# Patient Record
Sex: MALE | Race: WHITE | HISPANIC OR LATINO | Employment: FULL TIME | URBAN - METROPOLITAN AREA
[De-identification: names, ages, dates, MRNs, and addresses within clinical notes are randomized per-mention and may not be internally consistent; named-entity substitution may affect disease eponyms.]

---

## 2018-07-24 ENCOUNTER — HOSPITAL ENCOUNTER (EMERGENCY)
Facility: HOSPITAL | Age: 43
Discharge: HOME | End: 2018-07-25
Attending: EMERGENCY MEDICINE | Admitting: EMERGENCY MEDICINE
Payer: COMMERCIAL

## 2018-07-24 VITALS
WEIGHT: 208 LBS | RESPIRATION RATE: 16 BRPM | TEMPERATURE: 98.2 F | SYSTOLIC BLOOD PRESSURE: 150 MMHG | DIASTOLIC BLOOD PRESSURE: 81 MMHG | HEART RATE: 69 BPM | OXYGEN SATURATION: 98 %

## 2018-07-24 DIAGNOSIS — S89.90XA INJURY OF CALF: Primary | ICD-10-CM

## 2018-07-25 ENCOUNTER — OFFICE VISIT (OUTPATIENT)
Dept: OBGYN CLINIC | Facility: MEDICAL CENTER | Age: 43
End: 2018-07-25
Payer: COMMERCIAL

## 2018-07-25 VITALS
BODY MASS INDEX: 32.18 KG/M2 | DIASTOLIC BLOOD PRESSURE: 80 MMHG | HEART RATE: 62 BPM | HEIGHT: 67 IN | WEIGHT: 205 LBS | SYSTOLIC BLOOD PRESSURE: 124 MMHG

## 2018-07-25 DIAGNOSIS — S86.111A GASTROCNEMIUS STRAIN, RIGHT, INITIAL ENCOUNTER: Primary | ICD-10-CM

## 2018-07-25 PROCEDURE — 99203 OFFICE O/P NEW LOW 30 MIN: CPT | Performed by: FAMILY MEDICINE

## 2018-07-25 PROCEDURE — 99283 EMERGENCY DEPT VISIT LOW MDM: CPT

## 2018-07-25 RX ORDER — ACETAMINOPHEN 325 MG/1
650 TABLET ORAL ONCE
Status: COMPLETED | OUTPATIENT
Start: 2018-07-25 | End: 2018-07-25

## 2018-07-25 RX ORDER — METHOCARBAMOL 500 MG/1
500 TABLET, FILM COATED ORAL 4 TIMES DAILY PRN
Qty: 20 TABLET | Refills: 0 | Status: SHIPPED | OUTPATIENT
Start: 2018-07-25 | End: 2018-07-25

## 2018-07-25 RX ORDER — LIDOCAINE 40 MG/G
CREAM TOPICAL ONCE
Status: COMPLETED | OUTPATIENT
Start: 2018-07-25 | End: 2018-07-25

## 2018-07-25 RX ORDER — METHOCARBAMOL 500 MG/1
500 TABLET, FILM COATED ORAL ONCE
Status: COMPLETED | OUTPATIENT
Start: 2018-07-25 | End: 2018-07-25

## 2018-07-25 RX ORDER — SENNOSIDES 8.6 MG
650 CAPSULE ORAL EVERY 8 HOURS PRN
Qty: 30 TABLET | Refills: 0 | Status: SHIPPED | OUTPATIENT
Start: 2018-07-25 | End: 2018-07-25

## 2018-07-25 RX ADMIN — LIDOCAINE 4%: 4 CREAM TOPICAL at 00:47

## 2018-07-25 RX ADMIN — ACETAMINOPHEN 650 MG: 325 TABLET, FILM COATED ORAL at 00:47

## 2018-07-25 RX ADMIN — METHOCARBAMOL 500 MG: 500 TABLET ORAL at 00:47

## 2018-07-25 NOTE — LETTER
July 25, 2018     Patient: Mendoza Ayala   YOB: 1975   Date of Visit: 7/25/2018       To Whom it May Concern:    Lasha Rajwinder is under my professional care  He was seen in my office on 7/25/2018  He may return to work on 07/26/2018  If you have any questions or concerns, please don't hesitate to call           Sincerely,          Wilder Cantu III, DO        CC: Mendoza Carlsonels

## 2018-07-25 NOTE — DISCHARGE INSTRUCTIONS
Take medications as directed  Use ace wrap/ice/elevation and crutches for comfort  Follow up with Orthopedic Surgery for reevaluation and further workup as directed  Return to ED if new or worsening symptoms including unbearable pain, weakness/numbness/tingling in extremity or any other concerns for immediate reevaluation  Muscle Strain   WHAT YOU NEED TO KNOW:   A muscle strain is a twist, pull, or tear of a muscle or tendon  A tendon is a strong elastic tissue that connects a muscle to a bone  Signs of a strained muscle include bruising and swelling over the area, pain with movement, and loss of strength  DISCHARGE INSTRUCTIONS:   Return to the emergency department if:   · You suddenly cannot feel or move your injured muscle  Contact your healthcare provider if:   · Your pain and swelling worsen or do not go away  · You have questions or concerns about your condition or care  Medicines:   · NSAIDs  help decrease swelling and pain or fever  This medicine is available with or without a doctor's order  NSAIDs can cause stomach bleeding or kidney problems in certain people  If you take blood thinner medicine, always ask your healthcare provider if NSAIDs are safe for you  Always read the medicine label and follow directions  · Muscle relaxers  help decrease pain and muscle spasms  · Take your medicine as directed  Contact your healthcare provider if you think your medicine is not helping or if you have side effects  Tell him of her if you are allergic to any medicine  Keep a list of the medicines, vitamins, and herbs you take  Include the amounts, and when and why you take them  Bring the list or the pill bottles to follow-up visits  Carry your medicine list with you in case of an emergency  Follow up with your healthcare provider as directed: Your healthcare provider may suggest that you have a follow-up visit before you go back to your usual activity   Write down your questions so you remember to ask them during your visits  Self-care:   · 3 to 7 days after the injury:  Use Rest, Ice, Compression, and Elevation (RICE) to help stop bruising and decrease pain and swelling  ¨ Rest:  Rest your muscle to allow your injury to heal  When the pain decreases, begin normal, slow movements  For mild and moderate muscle strains, you should rest your muscles for about 2 days  However, if you have a severe muscle strain, you should rest for 10 to 14 days  You may need to use crutches to walk if your muscle strain is in your legs or lower body  ¨ Ice:  Put an ice pack on the injured area  Put a towel between the ice pack and your skin  Do not put the ice pack directly on your skin  You can use a package of frozen peas instead of an ice pack  ¨ Compression:  You may need to wrap an elastic bandage around the area to decrease swelling  It should be tight enough for you to feel support  Do not wrap it too tightly  ¨ Elevation:  Keep the injured muscle raised above your heart if possible  For example if you have a strain of your lower leg muscle, lie down and prop your leg up on pillows  This helps decrease pain and swelling  · 3 to 21 days after the injury:  Start to slowly and regularly exercise your muscle  This will help it heal  If you feel pain, decrease how hard you are exercising  · 1 to 6 weeks after the injury:  Stretch the injured muscle  Hold the stretch for about 30 seconds  Do this 4 times a day  You may stretch the muscle until you feel a slight pull  Stop stretching if you feel pain  · 2 weeks to 6 months after the injury:  The goal of this phase is to return to the activity you were doing before the injury happened, without hurting the muscle again  · 3 weeks to 6 months after the injury:  Keep stretching and strengthening your muscles to avoid injury  Slowly increase the time and distance that you exercise   You may have signs and symptoms of muscle strain 6 months after the injury, even if you do things to help it heal  In this case, you may need surgery on the muscle  © 2017 [facility] Information is for End User's use only and may not be sold, redistributed or otherwise used for commercial purposes  All illustrations and images included in CareNotes® are the copyrighted property of [employer]  or [employer]ago Colvin  The above information is an  only  It is not intended as medical advice for individual conditions or treatments  Talk to your doctor, nurse or pharmacist before following any medical regimen to see if it is safe and effective for you

## 2018-07-25 NOTE — PROGRESS NOTES
1  Gastrocnemius strain, right, initial encounter       No orders of the defined types were placed in this encounter  Imaging Studies (I personally reviewed results in PACS):    IMPRESSION:  Tennis calf secondary to basketball injury right leg medial gastroc strain      Return in about 2 weeks (around 8/8/2018)  Patient Instructions   Rest ice elevation  No heat  Start Cam walker boot  Patient may require crutches for distal help over next 1-2 weeks  I recommend against driving explained the patient that his lack of strength in his driving foot could result in risk of motor vehicle accident and/or death  Patient expressed understanding agreed to plan  Explained the patient that he has a tear in the medial head of his gastrocnemius muscle in his calf  Explained this is also known as tennis calf  This should resolve over the next 4-6 weeks completely  I instructed patient to perform range-of-motion exercises and also use ice therapy  Instructed to avoid driving over the next 1 week due to pain and reduced strength of posterior calf that results in limited pedal foot reaction time and would place him and others at risk for motor vehicle accident and death  I instructed gradual return to driving and to avoid long distances over the next 1-2 weeks as pain resolves and strength returns  If he does not have any relief resolution within the next 1 week then I recommend starting physical therapy to help speed up his recovery  I did review red flags including swelling, redness, worsening of pain instructed that this could be a sign of compartment syndrome or DVT/clot in the leg which can be limb and life threatening issue  Patient expressed understanding agreed to plan  CHIEF COMPLAINT:  Right calf pain    HPI:  Shane Gonzalez is a 43 y o  male  who presents for       Visit  07/25/2018:   Evaluation of right calf pain that occurred yesterday 07/24/2018    Patient states he was playing basketball yesterday the median the sprinting felt a popping like sensation in his calf with immediate pain  Patient was able to bear weight afterwards her he was limping  Today, patient presents with crutches as well as a German was given to him emergency department  He states that overall he feels no improvement since his injury yesterday  Patient states he is able to ambulate on it with difficulty due to pain  He points to the medial aspect of his grass [place] knee medius as source of achy intermittent sharp moderate intensity pain  Patient was given muscle relaxer Robaxin as well as lidocaine cream and Tylenol  Denies any pain at rest   Denies any numbness  Review of Systems   Constitutional: Negative for chills, fever and unexpected weight change  HENT: Negative for hearing loss, nosebleeds and sore throat  Eyes: Negative for pain, redness and visual disturbance  Respiratory: Negative for cough, shortness of breath and wheezing  Cardiovascular: Negative for chest pain, palpitations and leg swelling  Gastrointestinal: Negative for abdominal distention, nausea and vomiting  Endocrine: Negative for polydipsia and polyuria  Genitourinary: Negative for dysuria and hematuria  Skin: Negative for rash and wound  Neurological: Negative for dizziness, numbness and headaches  Psychiatric/Behavioral: Negative for decreased concentration and suicidal ideas  Following history reviewed and update:    History reviewed  No pertinent past medical history  History reviewed  No pertinent surgical history    Social History   History   Alcohol Use    Yes     Comment: social     History   Drug Use No     History   Smoking Status    Former Smoker   Smokeless Tobacco    Never Used     Family History   Problem Relation Age of Onset    No Known Problems Mother     No Known Problems Father      Allergies   Allergen Reactions    Ibuprofen Swelling     Lip swelling    Levothyroxine Physical Exam  Constitutional:  see vital signs  Gen: well-developed, normocephalic/atraumatic, well-groomed  Eyes: No inflammation or discharge of conjunctiva or lids; sclera clear   Pharynx: no inflammation, lesion, or mass of lips  Neck: supple, no masses, non-distended  MSK: no inflammation, lesion, mass, or clubbing of nails and digits except for other than mentioned below  SKIN: no visible rashes or skin lesions  Pulmonary/Chest: Effort normal  No respiratory distress     NEURO: cranial nerves grossly intact  PSYCH:  Alert and oriented to person, place, and time; recent and remote memory intact; mood normal, no depression, anxiety, or agitation, judgment and insight good and intact     Ortho Exam  RIGHT ANKLE & FOOT EXAM  Observation  GAIT:  normal    Inspection  Erythema: no  Ecchymosis: no  Edema:  none      Tenderness  Proximal Fibula: no  (Maisonneuve frx)  AiTFL: no  (2cm proximal-medial to tip lateral malleolus 92% sens, 29% spec)  ATFL: no  CFL: no  PTFL: no  Achilles:  no  deltoid: No  Peroneal: no  Tibialis Anterior: no  Tibialis Posterior: no    Bony Tenderpoints:  Lateral Malleolus: no  Base of 5th MT: no  Medial Malleolus: no  Navicular: no  Talar dome: No    ROM  Dorsiflexion: intact  Plantarflexion: intact    Muscle Strength  Pronation: intact without pain  Supination: intact without pain    Tib-Fib Squeeze: negative  (esouvynvouyr-tz-qmkmgtwquckscc squeeze; 26% sens, 88% spec; rule in test)    Calcaneal Squeeze: negative    Dorsiflexion (+) ER Stress Test: negative  (reproduce ATiFL mech; 71% sens, 63% spec)      RIGHT CALF EXAM:  No swelling erythema or increased warmth  No palpable cords  Tenderness: + significant tenderness medial gastrocs that reproduces patient's chief complaint of pain  Negative Conner sign  Eugene test intact obligatory plantar flexion and symmetric bilateral    RIGHT FOOT EXAM:  No swelling, erythema or increased warmth  Tenderness: none  ROM Toes extension: intact  ROM Toes flexion: intact  Strength Toes: 5/5 flex, ext  Sensation intact  Capillary refill intact         Procedures

## 2018-07-25 NOTE — PATIENT INSTRUCTIONS
Rest ice elevation  No heat  Start Cam walker boot  Patient may require crutches for distal help over next 1-2 weeks  I recommend against driving explained the patient that his lack of strength in his driving foot could result in risk of motor vehicle accident and/or death  Patient expressed understanding agreed to plan  Explained the patient that he has a tear in the medial head of his gastrocnemius muscle in his calf  Explained this is also known as tennis calf  This should resolve over the next 4-6 weeks completely  I instructed patient to perform range-of-motion exercises and also use ice therapy  Instructed to avoid driving over the next 1 week due to pain and reduced strength of posterior calf that results in limited pedal foot reaction time and would place him and others at risk for motor vehicle accident and death  I instructed gradual return to driving and to avoid long distances over the next 1-2 weeks as pain resolves and strength returns  If he does not have any relief resolution within the next 1 week then I recommend starting physical therapy to help speed up his recovery  I did review red flags including swelling, redness, worsening of pain instructed that this could be a sign of compartment syndrome or DVT/clot in the leg which can be limb and life threatening issue  Patient expressed understanding agreed to plan

## 2018-07-25 NOTE — ED PROVIDER NOTES
History  Chief Complaint   Patient presents with    Leg Injury     pt was playing basketball today, went to run for the ball and felt a pop/pain in his right calf     44 yo M presents to ED for evaluation of R posterior lower leg (calf) pain that occurred suddenly while running playing basketball  Pt states he heard a "pop" and experience significant pain to the area, difficulty ambulating with right calf swelling since that time, no ecchymosis and no c/o hip/knee/ankle/foot pain  Pt states he did not fall, no head injury/LOC, denies any additional trauma/injury, no other medical complaints at this time  Pt has no significant PMH or PSH  Pt is a former smoker, (+) ETOH use, denies any recreational drug use  Pt states only takes OTC herbal medications (arginine/carnitine for muscle building with his workouts)  No hx of similar events  No improvement with ice to area, no other interventions prior to arrival, no other complaints at this time  None       History reviewed  No pertinent past medical history  History reviewed  No pertinent surgical history  Family History   Problem Relation Age of Onset    No Known Problems Mother     No Known Problems Father      I have reviewed and agree with the history as documented  Social History   Substance Use Topics    Smoking status: Former Smoker    Smokeless tobacco: Never Used    Alcohol use Yes      Comment: social        Review of Systems   Constitutional: Negative for chills, fatigue and fever  HENT: Negative for congestion, rhinorrhea and sore throat  Eyes: Negative for pain, redness and visual disturbance  Respiratory: Negative for cough, chest tightness, shortness of breath, wheezing and stridor  Cardiovascular: Negative for chest pain, palpitations and leg swelling  Gastrointestinal: Negative for abdominal pain, blood in stool, constipation, diarrhea, nausea and vomiting     Genitourinary: Negative for dysuria, frequency, hematuria and urgency  Musculoskeletal: Negative for back pain and neck pain  R calf injury/swelling   Skin: Negative for pallor and rash  Neurological: Negative for dizziness, seizures, syncope, weakness, light-headedness and headaches  Psychiatric/Behavioral: Negative for agitation and confusion  Physical Exam  ED Triage Vitals [07/24/18 2339]   Temperature Pulse Respirations Blood Pressure SpO2   98 2 °F (36 8 °C) 69 16 150/81 98 %      Temp Source Heart Rate Source Patient Position - Orthostatic VS BP Location FiO2 (%)   Tympanic -- -- -- --      Pain Score       9           Orthostatic Vital Signs  Vitals:    07/24/18 2339   BP: 150/81   Pulse: 69       Physical Exam   Constitutional: He is oriented to person, place, and time  He appears well-developed and well-nourished  No distress  HENT:   Head: Normocephalic and atraumatic  Nose: Nose normal    Mouth/Throat: Oropharynx is clear and moist  No oropharyngeal exudate  Eyes: Conjunctivae and EOM are normal  Pupils are equal, round, and reactive to light  Right eye exhibits no discharge  Left eye exhibits no discharge  Neck: Normal range of motion  Neck supple  No JVD present  No tracheal deviation present  Cardiovascular: Normal rate, regular rhythm, normal heart sounds and intact distal pulses  Exam reveals no gallop and no friction rub  No murmur heard  Pulmonary/Chest: Effort normal and breath sounds normal  No stridor  No respiratory distress  He has no wheezes  He has no rales  He exhibits no tenderness  Abdominal: Soft  Bowel sounds are normal  He exhibits no distension  There is no tenderness  There is no rebound and no guarding  Musculoskeletal: Normal range of motion  He exhibits edema and tenderness  He exhibits no deformity     Intact dorsiflexion/plantar flexion of affected foot, small amount of swelling along achilles tendon (+) Eugene test, tenderness over medial calf muscle belly (along gastrocnemius m ) with swelling to calf 2 cm > than L calf (42 5 cm at max width) but soft compartments, neurovascularly intact, no open wounds/ecchymosis   Neurological: He is alert and oriented to person, place, and time  No cranial nerve deficit  Skin: Skin is warm and dry  No rash noted  He is not diaphoretic  Psychiatric: He has a normal mood and affect  Nursing note and vitals reviewed  ED Medications  Medications   acetaminophen (TYLENOL) tablet 650 mg (650 mg Oral Given 7/25/18 0047)   methocarbamol (ROBAXIN) tablet 500 mg (500 mg Oral Given 7/25/18 0047)   lidocaine (LMX) 4 % cream ( Topical Given 7/25/18 0047)       Diagnostic Studies  Results Reviewed     None                 No orders to display         Procedures  Procedures      Phone Consults  ED Phone Contact    ED Course       Ultrasound of leg shows diffuse inflammatory changes of calf muscle, no significant tears/hematomas noted along calf muscles/achilles/popliteal fossa       Lidocaine cream with ace wrap and crutches, analgesics provided  Patient reevaluated with improvement in condition noted  Patient updated on results of tests  Discharge instructions given including medications, follow-up and return precautions with understanding verbalized  MDM  CritCare Time    Disposition  Final diagnoses:   Injury of calf - right medial calf injury     Time reflects when diagnosis was documented in both MDM as applicable and the Disposition within this note     Time User Action Codes Description Comment    7/25/2018 12:52 AM Alison Mosqueda Add [S89 90XA] Injury of calf     7/25/2018 12:53 AM Alison Mosqueda Modify [S89 90XA] Injury of calf right medial calf injury      ED Disposition     ED Disposition Condition Comment    Discharge  Rosalba Nyhacara discharge to home/self care      Condition at discharge: Stable        Follow-up Information     Follow up With Specialties Details Why [id number] [facility] Orthopedic Surgery  Call to schedule appointment for reevaluation and further workup of R calf injury as directed [address] [id number]  [phone]          Discharge Medication List as of 7/25/2018 12:56 AM      START taking these medications    Details   acetaminophen (TYLENOL) 650 mg CR tablet Take 1 tablet (650 mg total) by mouth every 8 (eight) hours as needed for mild pain or moderate pain, Starting Wed 7/25/2018, Print      methocarbamol (ROBAXIN) 500 mg tablet Take 1 tablet (500 mg total) by mouth 4 (four) times a day as needed for muscle spasms, Starting Wed 7/25/2018, Print         CONTINUE these medications which have NOT CHANGED    Details   Arginine 1000 MG TABS Take 2,500 mg by mouth, Historical Med           No discharge procedures on file  ED Provider  Attending physically available and evaluated Yvette Fernandez I managed the patient along with the ED Attending      Electronically Signed by         Nancy Peters DO  07/27/18 9426

## 2018-07-25 NOTE — ED ATTENDING ATTESTATION
Walter Ochoa MD, saw and evaluated the patient  I have discussed the patient with the resident/non-physician practitioner and agree with the resident's/non-physician practitioner's findings, Plan of Care, and MDM as documented in the resident's/non-physician practitioner's note, except where noted  All available labs and Radiology studies were reviewed  At this point I agree with the current assessment done in the Emergency Department  I have conducted an independent evaluation of this patient a history and physical is as follows:    Patient presents with R calf pain that started while running playing basketball  Reports feeling a pop  Unable to ambulate prior to coming in  No additional injuries or complaints  Exam: AAOx3, NAD, RRR, CTA, S/NT/ND  A/P: Chest pain  Will check cardiac labs, ekg, cxr, and ekg T/ND  A/P: Chest pain  Will check cardiac labs, ekg, cxr, and ekg      Critical Care Time  CritCare Time    Procedures

## 2018-08-02 ENCOUNTER — TELEPHONE (OUTPATIENT)
Dept: OBGYN CLINIC | Facility: HOSPITAL | Age: 43
End: 2018-08-02

## 2018-08-02 ENCOUNTER — APPOINTMENT (EMERGENCY)
Dept: NON INVASIVE DIAGNOSTICS | Facility: HOSPITAL | Age: 43
End: 2018-08-02
Payer: COMMERCIAL

## 2018-08-02 ENCOUNTER — HOSPITAL ENCOUNTER (EMERGENCY)
Facility: HOSPITAL | Age: 43
Discharge: HOME | End: 2018-08-02
Attending: EMERGENCY MEDICINE
Payer: COMMERCIAL

## 2018-08-02 VITALS
DIASTOLIC BLOOD PRESSURE: 78 MMHG | TEMPERATURE: 98.8 F | OXYGEN SATURATION: 99 % | HEART RATE: 73 BPM | SYSTOLIC BLOOD PRESSURE: 117 MMHG | RESPIRATION RATE: 16 BRPM

## 2018-08-02 DIAGNOSIS — S86.111D STRAIN OF GASTROCNEMIUS MUSCLE, RIGHT, SUBSEQUENT ENCOUNTER: Primary | ICD-10-CM

## 2018-08-02 PROCEDURE — 99284 EMERGENCY DEPT VISIT MOD MDM: CPT

## 2018-08-02 PROCEDURE — 93971 EXTREMITY STUDY: CPT

## 2018-08-02 RX ORDER — ACETAMINOPHEN 325 MG/1
650 TABLET ORAL ONCE
Status: COMPLETED | OUTPATIENT
Start: 2018-08-02 | End: 2018-08-02

## 2018-08-02 RX ADMIN — ACETAMINOPHEN 650 MG: 325 TABLET, FILM COATED ORAL at 18:19

## 2018-08-02 NOTE — TELEPHONE ENCOUNTER
Caller: patient  Callback# [phone]  Dr Nury Gonzalez      Patient was seen in office 08/01 for lower leg pain he is still experiencing pain  What should he do? Please advise thanks

## 2018-08-02 NOTE — TELEPHONE ENCOUNTER
Dr Osa Kawasaki patient - 7/25 Gastroc strain    Patient calling back that he is still experiencing pain, and swelling, possibly a bit worse than when he was in the office, also complaining of tenderness to touch  Denies redness  He is wondering if you can give him an RX for outpatient venous doppler to rule out DVT as you advised him about at last ov

## 2018-08-02 NOTE — TELEPHONE ENCOUNTER
Called and spoke to Patient  Said he could come in today at 1:45 or go to the ER  Patient said he would find a ride so he could avoid waiting in ER  Patient was put on schedule for 1:45

## 2018-08-02 NOTE — ED NOTES
Spoke with vascular at this time   Will be over shortly to compete doppler     Bailee Segura RN  08/02/18 0635

## 2018-08-02 NOTE — ED PROVIDER NOTES
History  Chief Complaint   Patient presents with    Leg Pain     pt was seen for a right calf injury 10 days ago while playing basketball  reports the pain and swelling has not gotten worse, but has not gotten better and he was told to come in if it did not improve by his orthopedist     Patient is a 80-year-old male presents with right calf pain  Patient presented to the ED approximately 10 days ago with the above complaint  Patient had immediate onset of right calf pain while playing basketball  He said he heard a pop at that time  During his previous visit the patient had an ultrasound which was negative for hematoma or muscle tear per previous note  The following day the patient followed up with Orthopedics (Dr Fabian Felder)  Dr Gustavo Pascual after he diagnosed the patient with a right calf strain  He was instructed to use a walking boot, elevate the leg, ice it  Patient is followed the instructions but has failed to improve  Patient tells me he has had constant right medial calf pain since this time  He notes that the swelling has yet to go down though he denies any erythema in the area  Patient is ambulatory in the walking boot so he does have a limping gait  Patient denies foot numbness, paresthesias, paralysis, cool extremities  Patient has no history of DVTs  He denies chest pain, dyspnea, hemoptysis  None       History reviewed  No pertinent past medical history  History reviewed  No pertinent surgical history  Family History   Problem Relation Age of Onset    No Known Problems Mother     No Known Problems Father      I have reviewed and agree with the history as documented  Social History   Substance Use Topics    Smoking status: Former Smoker    Smokeless tobacco: Never Used    Alcohol use Yes      Comment: social        Review of Systems   Constitutional: Negative for chills, diaphoresis, fatigue and fever     HENT: Negative for drooling, facial swelling, sore throat and trouble swallowing  Eyes: Negative for photophobia  Respiratory: Negative for cough, chest tightness and shortness of breath  Cardiovascular: Positive for leg swelling  Negative for chest pain and palpitations  Gastrointestinal: Negative for abdominal distention, abdominal pain, nausea and vomiting  Genitourinary: Negative for dysuria  Musculoskeletal: Positive for gait problem  Negative for back pain, neck pain and neck stiffness  Right calf pain   Skin: Negative for color change, pallor and rash  Neurological: Negative for dizziness, speech difficulty, weakness, light-headedness, numbness and headaches  Hematological: Negative for adenopathy  All other systems reviewed and are negative  Physical Exam  ED Triage Vitals   Temperature Pulse Respirations Blood Pressure SpO2   08/02/18 1620 08/02/18 1620 08/02/18 1620 08/02/18 1620 08/02/18 1620   98 8 °F (37 1 °C) 73 16 117/78 99 %      Temp Source Heart Rate Source Patient Position - Orthostatic VS BP Location FiO2 (%)   08/02/18 1620 08/02/18 1620 08/02/18 1620 08/02/18 1620 --   Tympanic Monitor Sitting Left arm       Pain Score       08/02/18 1819       9           Orthostatic Vital Signs  Vitals:    08/02/18 1620   BP: 117/78   Pulse: 73   Patient Position - Orthostatic VS: Sitting       Physical Exam   Constitutional: He is oriented to person, place, and time  He appears well-developed and well-nourished  No distress  HENT:   Head: Normocephalic  Eyes: EOM are normal  Pupils are equal, round, and reactive to light  Neck: Normal range of motion  Neck supple  Cardiovascular: Normal rate, regular rhythm, normal heart sounds and intact distal pulses  Exam reveals no gallop and no friction rub  No murmur heard  Pulmonary/Chest: Effort normal and breath sounds normal    Abdominal: Soft  Bowel sounds are normal  He exhibits no distension  There is no tenderness  There is no guarding     Musculoskeletal: Normal range of motion  Patient is found to have tenderness over the right medial gastrocnemius  Swelling of right calf is noted with no erythema  Compartments are soft  Neurovascularly intact distally including 2+ DP and posterior tibial pulses  Weakness plantar foot flexion of the foot  Sensation intact throughout  Neurological: He is alert and oriented to person, place, and time  No cranial nerve deficit or sensory deficit  He exhibits normal muscle tone  Skin: Capillary refill takes less than 2 seconds  Psychiatric: He has a normal mood and affect  His behavior is normal  Judgment and thought content normal    Vitals reviewed  ED Medications  Medications   acetaminophen (TYLENOL) tablet 650 mg (650 mg Oral Given 8/2/18 1819)       Diagnostic Studies  Results Reviewed     None                 VAS lower limb venous duplex study, unilateral/limited    (Results Pending)         Procedures  Procedures      Phone Consults  ED Phone Contact    ED Course  ED Course as of Aug 02 2101   Thu Aug 02, 2018   1813 Patient resting comfortably in the room  2052 Duplex negative                                MDM  Number of Diagnoses or Management Options  Strain of gastrocnemius muscle, right, subsequent encounter:   Diagnosis management comments: Patient is a 44-year-old male presents with persistent right calf pain  Ultrasound has been ordered to rule out DVT, hematoma, muscle tear  Ultrasound negative  Patient told to return for follow-up with Dr Dominique Melchor    Patient told to continue supportive measures including elevation, ice, rest        Amount and/or Complexity of Data Reviewed  Clinical lab tests: ordered and reviewed  Tests in the radiology section of CPT®: ordered and reviewed  Tests in the medicine section of CPT®: ordered and reviewed    Risk of Complications, Morbidity, and/or Mortality  Presenting problems: low  Diagnostic procedures: minimal  Management options: minimal    Patient Progress  Patient progress: stable    CritCare Time    Disposition  Final diagnoses:   Strain of gastrocnemius muscle, right, subsequent encounter     Time reflects when diagnosis was documented in both MDM as applicable and the Disposition within this note     Time User Action Codes Description Comment    8/2/2018  8:53 PM Dee Gordon Add [S86 [id number]] Strain of gastrocnemius muscle, right, subsequent encounter       ED Disposition     ED Disposition Condition Comment    Discharge  Silvia Varghese discharge to home/self care  Condition at discharge: Stable        Follow-up Information     Follow up With Specialties Details Why [facility], DO Sports Medicine, Orthopedics   [facility] [address] [facility]  [phone]      [place] [address] In 1 day  [address]  [phone]            There are no discharge medications for this patient  No discharge procedures on file  ED Provider  Attending physically available and evaluated Silvia Varghese I managed the patient along with the ED Attending      Electronically Signed by         Damion Vega MD  08/02/18 1731

## 2018-08-02 NOTE — ED ATTENDING ATTESTATION
I, Misti Kennedy DO, saw and evaluated the patient  I have discussed the patient with the resident/non-physician practitioner and agree with the resident's/non-physician practitioner's findings, Plan of Care, and MDM as documented in the resident's/non-physician practitioner's note, except where noted  All available labs and Radiology studies were reviewed  At this point I agree with the current assessment done in the Emergency Department  I have conducted an independent evaluation of this patient a history and physical is as follows:      Critical Care Time  CritCare Time    Procedures     43 yr male to the ED with continued pain in left calf after injury a month ago  Sent to the ED by ortho for MRI or US  Pain at the same location since the injury  Pt with walking shoe and does a lot of walking at work  Crutches are hurting arm pits  Exm; 3 cm area hardness at the med MsTend junction of the gastroc  Pln: US and referral to ortho

## 2018-08-02 NOTE — TELEPHONE ENCOUNTER
Patient needs appt asap today or go to ER  He needs to have a doctor look at his leg to make sure he does not have an infection   Fit him in today with me and then I will order ultrasound

## 2018-08-03 PROCEDURE — 93971 EXTREMITY STUDY: CPT | Performed by: SURGERY

## 2018-08-03 NOTE — DISCHARGE INSTRUCTIONS
Muscle Strain   AMBULATORY CARE:   A muscle strain  is a twist, pull, or tear of a muscle or tendon  A tendon is a strong elastic tissue that connects a muscle to a bone  Common symptoms include the following:   · Bruised skin on the area of your injured muscle    · Muscle soreness, cramps, or spasms    · Little or stiff muscle movement, or loss of muscle strength    · Swelling in the area of the injury    · Muscle pain that gets worse with activity, or pain that moves or spreads to another body area    · Crepitus (crackling sound or grating feeling) when you move your muscle  Seek immediate care for the following symptoms:   · Sudden loss of feeling or movement in your injured muscle    Contact your healthcare provider if:   · Your pain and swelling worsen or do not go away  · You have questions or concerns about your condition or care  Treatment for a muscle strain  may include any of the following:  · NSAIDs , such as ibuprofen, help decrease swelling, pain, and fever  This medicine is available with or without a doctor's order  NSAIDs can cause stomach bleeding or kidney problems in certain people  If you take blood thinner medicine, always ask your healthcare provider if NSAIDs are safe for you  Always read the medicine label and follow directions  · Muscle relaxers  help decrease pain and muscle spasms, and relax your muscles  · Steroid medicine  may be given to decrease pain and inflammation  · Local anesthetic  is medicine used to numb the area for a short time  This is often used if you have a muscle strain in your back  · Physical therapy exercises  may help improve movement and decrease pain  Physical therapy can also help improve strength and decrease your risk for loss of function  · Surgery  may be needed if your muscle strain does not heal after 6 months of treatment  Surgery may be done to drain blood that has pooled in your muscle   If your tendon was torn off of the bone, it may be put back with surgery  Manage your symptoms:   · Rest your muscle  to allow your injury to heal  When the pain decreases, begin normal, slow movements  For mild and moderate muscle strains, rest your muscles for about 2 days  Rest for 10 to 14 days if you have a severe muscle strain  You may need to use crutches if your muscle strain is in your legs  · Apply ice on your injured area  for 15 to 20 minutes every hour or as directed  Use an ice pack, or put crushed ice in a plastic bag  Cover it with a towel  Ice helps prevent tissue damage and decreases swelling and pain  · Use an elastic bandage as directed  Wrap the bandage around the area to decrease swelling  It should be snug, but not too tight  · Elevate your injured muscle  above the level of your heart as often as you can  This will help decrease swelling and pain  Prop your injured muscle on pillows or blankets to keep it elevated comfortably  · Stretch and strengthen your muscles each day  Stretch for about 30 seconds, 4 times a day  Stretch the muscle until you feel a slight pull  Stop stretching if you feel pain  Start to exercise and strengthen your muscles slowly  Increase the time and amount you exercise  Prevent another muscle strain:   · Always wear proper shoes when you play sports  Replace your old running shoes with new ones often if you are a runner  Use shoe inserts or arch supports to correct leg or foot problems  Ask your healthcare provider for more information on shoe supports  · Do warm up and cool down exercises  Do stretching exercises before you work out or do sports activities  These exercises will help loosen and decrease stress on your muscles  Cool down and stretch after your workout  Do not stop and rest after a workout without cooling down first            · Keep your muscles strong with strength training exercises    Exercises such as weight lifting and stretching exercises help keep your muscles flexible and strong  A physical therapist or  may help you with these exercises  · Slowly start your exercise or sports training program   Follow your healthcare provider's directions on when to start exercising  Slowly increase time, distance, and how often you train  Sudden increases in how often you train may cause you to injure your muscle again  Follow up with your healthcare provider as directed:  Write down your questions so you remember to ask them during your visits  © 2017 [facility] Information is for End User's use only and may not be sold, redistributed or otherwise used for commercial purposes  All illustrations and images included in CareNotes® are the copyrighted property of A D A M , Inc  or [employer]  The above information is an  only  It is not intended as medical advice for individual conditions or treatments  Talk to your doctor, nurse or pharmacist before following any medical regimen to see if it is safe and effective for you

## 2018-08-08 ENCOUNTER — OFFICE VISIT (OUTPATIENT)
Dept: OBGYN CLINIC | Facility: MEDICAL CENTER | Age: 43
End: 2018-08-08
Payer: COMMERCIAL

## 2018-08-08 VITALS
BODY MASS INDEX: 32.49 KG/M2 | HEIGHT: 67 IN | WEIGHT: 207 LBS | HEART RATE: 67 BPM | DIASTOLIC BLOOD PRESSURE: 76 MMHG | SYSTOLIC BLOOD PRESSURE: 120 MMHG

## 2018-08-08 DIAGNOSIS — S86.111A GASTROCNEMIUS STRAIN, RIGHT, INITIAL ENCOUNTER: Primary | ICD-10-CM

## 2018-08-08 PROCEDURE — 99213 OFFICE O/P EST LOW 20 MIN: CPT | Performed by: FAMILY MEDICINE

## 2018-08-08 NOTE — LETTER
August 8, 2018     Patient: Silvia Varghese   YOB: 1975   Date of Visit: 8/8/2018       To Whom it May Concern:    Bulmaro Contreras is under my professional care  He was seen in my office on 8/8/2018  He may return to work on 8/8/18  Please allow use of crutches at work due to right lower extremity injury  If you have any questions or concerns, please don't hesitate to call           Sincerely,          Guerita Martinez III, DO        CC: Silvia Varghese

## 2018-08-08 NOTE — PROGRESS NOTES
1  Gastrocnemius strain, right, initial encounter       No orders of the defined types were placed in this encounter  Imaging Studies (I personally reviewed results in PACS):    IMPRESSION:  Tennis calf secondary to basketball injury right leg medial gastroc strain  DOI: 7/24/18  F/u: 2 weeks 1 day    Return in about 2 weeks (around 8/22/2018)  Patient Instructions   Start home exercises with handout given to patient  Return to weight bearing as tolerated  No driving until able to walk without difficulty and also perform single leg stance toe raise without difficulty  Instructed if any worsening swelling, pain, redness then to go to ER immediately  CHIEF COMPLAINT:  Right calf pain    HPI:  Ankit Milian is a 43 y o  male  who presents for       Visit  07/25/2018:   Evaluation of right calf pain that occurred yesterday 07/24/2018  Patient states he was playing basketball yesterday the median the sprinting felt a popping like sensation in his calf with immediate pain  Patient was able to bear weight afterwards her he was limping  Today, patient presents with crutches as well as a Welsh was given to him emergency department  He states that overall he feels no improvement since his injury yesterday  Patient states he is able to ambulate on it with difficulty due to pain  He points to the medial aspect of his grass rock knee medius as source of achy intermittent sharp moderate intensity pain  Patient was given muscle relaxer Robaxin as well as lidocaine cream and Tylenol  Denies any pain at rest   Denies any numbness  8/8/18:  F/U right tennis calf  Improving  50% improved  Did have worsening pain last week  Was offered same day appointment but have difficulty with transportation so went to ER where had US performed showing no DVT  Today, feeling improved overall and from last week  He still continues to point to medial gastroc as source of pain    He has been using his crutches for help with ambulation  He has been compliant with no driving  Review of Systems   Constitutional: Negative for chills and fever  Neurological: Negative for weakness  Following history reviewed and update:    History reviewed  No pertinent past medical history  History reviewed  No pertinent surgical history  Social History   History   Alcohol Use    Yes     Comment: social     History   Drug Use No     History   Smoking Status    Former Smoker   Smokeless Tobacco    Never Used     Family History   Problem Relation Age of Onset    No Known Problems Mother     No Known Problems Father      Allergies   Allergen Reactions    Ibuprofen Swelling     Lip swelling    Levothyroxine           Physical Exam  Constitutional:  see vital signs  Gen: well-developed, normocephalic/atraumatic, well-groomed  Eyes: No inflammation or discharge of conjunctiva or lids; sclera clear   Pharynx: no inflammation, lesion, or mass of lips  Neck: supple, no masses, non-distended  MSK: no inflammation, lesion, mass, or clubbing of nails and digits except for other than mentioned below  SKIN: no visible rashes or skin lesions  Pulmonary/Chest: Effort normal  No respiratory distress     NEURO: cranial nerves grossly intact  PSYCH:  Alert and oriented to person, place, and time; recent and remote memory intact; mood normal, no depression, anxiety, or agitation, judgment and insight good and intact       Ortho Exam  RIGHT ANKLE & FOOT EXAM  Observation  GAIT:  Antalgic right but able to bear weight    Inspection  Erythema: no  Ecchymosis: no  Edema:  none      Tenderness  Proximal Fibula: no  (Maisonneuve frx)  AiTFL: no  (2cm proximal-medial to tip lateral malleolus 92% sens, 29% spec)  ATFL: no  CFL: no  PTFL: no  Achilles:  no  deltoid: No  Peroneal: no  Tibialis Anterior: no  Tibialis Posterior: no    Bony Tenderpoints:  Lateral Malleolus: no  Base of 5th MT: no  Medial Malleolus: no  Navicular: no  Talar dome: No    ROM  Dorsiflexion: intact  Plantarflexion: intact    Muscle Strength  Pronation: intact without pain  Supination: intact without pain    Tib-Fib Squeeze: negative  (ahzchndzmgnu-xo-oeponjzuyfvuok squeeze; 26% sens, 88% spec; rule in test)    Calcaneal Squeeze: negative    Dorsiflexion (+) ER Stress Test: negative  (reproduce ATiFL mech; 71% sens, 63% spec)      RIGHT CALF EXAM:  No swelling erythema or increased warmth  No palpable cords  Tenderness: + medial head gastroc  Negative Conner sign  Eugene test with intact obligatory plantar flexion symmetric to contralateral side    RIGHT FOOT EXAM:  No swelling, erythema or increased warmth  No pain with passive stretching of foot or ankle or toes   Tenderness: none  ROM Toes extension: intact  ROM Toes flexion: intact  Strength Toes: 5/5 flex, ext  Sensation intact  Capillary refill intact  Metatarsal squeeze negative     Procedures    Total visit time was 15 minutes of which more than 50% was face to face counseling and/or coordination of care with patient regarding their treatment plan as outlined in note

## 2018-08-08 NOTE — PATIENT INSTRUCTIONS
Start home exercises with handout given to patient  Return to weight bearing as tolerated  No driving until able to walk without difficulty and also perform single leg stance toe raise without difficulty  Instructed if any worsening swelling, pain, redness then to go to ER immediately

## 2018-08-20 ENCOUNTER — OFFICE VISIT (OUTPATIENT)
Dept: FAMILY MEDICINE CLINIC | Facility: CLINIC | Age: 43
End: 2018-08-20
Payer: COMMERCIAL

## 2018-08-20 VITALS
HEIGHT: 68 IN | RESPIRATION RATE: 20 BRPM | OXYGEN SATURATION: 97 % | HEART RATE: 70 BPM | WEIGHT: 214.8 LBS | SYSTOLIC BLOOD PRESSURE: 116 MMHG | DIASTOLIC BLOOD PRESSURE: 72 MMHG | TEMPERATURE: 97.4 F | BODY MASS INDEX: 32.55 KG/M2

## 2018-08-20 DIAGNOSIS — E61.1 IRON DEFICIENCY: ICD-10-CM

## 2018-08-20 DIAGNOSIS — M54.2 NECK PAIN: ICD-10-CM

## 2018-08-20 DIAGNOSIS — E66.9 OBESITY (BMI 30-39.9): ICD-10-CM

## 2018-08-20 DIAGNOSIS — E03.9 HYPOTHYROIDISM, UNSPECIFIED TYPE: Primary | ICD-10-CM

## 2018-08-20 DIAGNOSIS — E29.1 HYPOGONADISM IN MALE: ICD-10-CM

## 2018-08-20 PROCEDURE — 1036F TOBACCO NON-USER: CPT | Performed by: FAMILY MEDICINE

## 2018-08-20 PROCEDURE — 99203 OFFICE O/P NEW LOW 30 MIN: CPT | Performed by: FAMILY MEDICINE

## 2018-08-20 NOTE — PROGRESS NOTES
Chief Complaint   Patient presents with   Mylene Henson Establish Care     New Patient     Assessment/Plan:    Hypothyroidism---Will check TSH  Hypogonadism in male---Will check testosterone  If need treatment, would have PSA baseline level also  Iron deficiency---will check CBC, iron level  Neck pain---Will check neck xray and refer to physical therapy  Obesity---lose weight  RTO in 6 months or sooner as needed  Diagnoses and all orders for this visit:    Hypothyroidism, unspecified type  -     TSH, 3rd generation with Free T4 reflex; Future    Hypogonadism in male  -     Testosterone, free, total; Future  -     PSA, total and free; Future    Iron deficiency  -     CBC and differential; Future  -     Iron; Future  -     Iron Saturation %; Future  -     Ferritin; Future    Neck pain  -     XR spine cervical complete 4 or 5 vw non injury; Future  -     Ambulatory referral to Physical Therapy; Future    Obesity (BMI 30-39 9)  -     Comprehensive metabolic panel; Future  -     Lipid Panel with Direct LDL reflex; Future    Other orders  -     Arginine POWD; by Does not apply route          Subjective:      Patient ID: Rachel Wood is a 43 y o  male  HPI  Pt is here by himself to establish care  Have not seen PCP for years  Hypothyroidism---Pt states he had Hashimoto hypothyroidism years ago  He was on levothyroxine 25 or 50mcg for a while  He feels fine and stopped levothyroxine  No recent labs  Hypogonadism---Pt states his testosterone level was low before, never had treatment because of insurance change  Sometimes feels tired and erectile dysfunction  Would like to recheck testosterone level  Iron deficiency---Pt states he had low iron level before  He could not tolerate iron pills because it made him constipated  Would like to recheck  Denies lightheaded, palpitation, Sob, CP, n/v/abd pain  Neck pain---c/o neck pain for 8 months  Come and go  Worse in right side  Dull pain, 2-6/10   Denies injury  Denies numbness/tingling of arms  He tried stretch but no help  Obesity---BMI 32 66 today  Tried to eat healthy  Exercise regularly  Quit smoking 10 years ago  Socially alcohol  No drugs  Denies depression  The following portions of the patient's history were reviewed and updated as appropriate: allergies, current medications, past family history, past medical history, past social history, past surgical history and problem list     Review of Systems   Constitutional: Negative for appetite change, chills and fever  HENT: Negative for congestion, ear pain, sinus pain and sore throat  Eyes: Negative for discharge and itching  Respiratory: Negative for apnea, cough, chest tightness, shortness of breath and wheezing  Cardiovascular: Negative for chest pain, palpitations and leg swelling  Gastrointestinal: Negative for abdominal pain, anal bleeding, constipation, diarrhea, nausea and vomiting  Endocrine: Negative for cold intolerance, heat intolerance and polyuria  Genitourinary: Negative for difficulty urinating and dysuria  Musculoskeletal: Positive for neck pain  Negative for arthralgias, back pain and myalgias  Skin: Negative for rash  Neurological: Negative for dizziness and headaches  Psychiatric/Behavioral: Negative for agitation  Objective:      /72 (BP Location: Left arm, Patient Position: Sitting, Cuff Size: Adult)   Pulse 70   Temp (!) 97 4 °F (36 3 °C) (Oral)   Resp 20   Ht 5' 8" (1 727 m)   Wt 97 4 kg (214 lb 12 8 oz)   SpO2 97%   BMI 32 66 kg/m²          Physical Exam   Constitutional: He appears well-developed  HENT:   Head: Normocephalic and atraumatic  Eyes: Conjunctivae are normal  Pupils are equal, round, and reactive to light  Neck: Normal range of motion  Neck supple  Cardiovascular: Normal rate, regular rhythm, normal heart sounds and intact distal pulses      Pulmonary/Chest: Effort normal and breath sounds normal  Abdominal: Soft  Bowel sounds are normal    Musculoskeletal: Normal range of motion  Right trapezius spasm   Neurological: He is alert

## 2018-08-24 ENCOUNTER — LAB (OUTPATIENT)
Dept: LAB | Facility: HOSPITAL | Age: 43
End: 2018-08-24
Payer: COMMERCIAL

## 2018-08-24 ENCOUNTER — APPOINTMENT (OUTPATIENT)
Dept: LAB | Facility: HOSPITAL | Age: 43
End: 2018-08-24
Payer: COMMERCIAL

## 2018-08-24 ENCOUNTER — TRANSCRIBE ORDERS (OUTPATIENT)
Dept: LAB | Facility: HOSPITAL | Age: 43
End: 2018-08-24

## 2018-08-24 DIAGNOSIS — E03.9 HYPOTHYROIDISM, UNSPECIFIED TYPE: ICD-10-CM

## 2018-08-24 DIAGNOSIS — E66.9 OBESITY (BMI 30-39.9): ICD-10-CM

## 2018-08-24 DIAGNOSIS — Z00.8 HEALTH EXAMINATION IN POPULATION SURVEY: ICD-10-CM

## 2018-08-24 DIAGNOSIS — Z00.8 HEALTH EXAMINATION IN POPULATION SURVEY: Primary | ICD-10-CM

## 2018-08-24 DIAGNOSIS — E61.1 IRON DEFICIENCY: ICD-10-CM

## 2018-08-24 DIAGNOSIS — E29.1 HYPOGONADISM IN MALE: ICD-10-CM

## 2018-08-24 LAB
ALBUMIN SERPL BCP-MCNC: 3.7 G/DL (ref 3.5–5)
ALP SERPL-CCNC: 46 U/L (ref 46–116)
ALT SERPL W P-5'-P-CCNC: 20 U/L (ref 12–78)
ANION GAP SERPL CALCULATED.3IONS-SCNC: 5 MMOL/L (ref 4–13)
AST SERPL W P-5'-P-CCNC: 15 U/L (ref 5–45)
BASOPHILS # BLD AUTO: 0.02 THOUSANDS/ΜL (ref 0–0.1)
BASOPHILS NFR BLD AUTO: 1 % (ref 0–1)
BILIRUB SERPL-MCNC: 0.4 MG/DL (ref 0.2–1)
BUN SERPL-MCNC: 15 MG/DL (ref 5–25)
CALCIUM SERPL-MCNC: 8.7 MG/DL (ref 8.3–10.1)
CHLORIDE SERPL-SCNC: 107 MMOL/L (ref 100–108)
CHOLEST SERPL-MCNC: 150 MG/DL (ref 50–200)
CO2 SERPL-SCNC: 27 MMOL/L (ref 21–32)
CREAT SERPL-MCNC: 1.08 MG/DL (ref 0.6–1.3)
EOSINOPHIL # BLD AUTO: 0.25 THOUSAND/ΜL (ref 0–0.61)
EOSINOPHIL NFR BLD AUTO: 6 % (ref 0–6)
ERYTHROCYTE [DISTWIDTH] IN BLOOD BY AUTOMATED COUNT: 18.3 % (ref 11.6–15.1)
EST. AVERAGE GLUCOSE BLD GHB EST-MCNC: 114 MG/DL
FERRITIN SERPL-MCNC: 9 NG/ML (ref 8–388)
GFR SERPL CREATININE-BSD FRML MDRD: 84 ML/MIN/1.73SQ M
GLUCOSE P FAST SERPL-MCNC: 105 MG/DL (ref 65–99)
HBA1C MFR BLD: 5.6 % (ref 4.2–6.3)
HCT VFR BLD AUTO: 38.9 % (ref 36.5–49.3)
HDLC SERPL-MCNC: 33 MG/DL (ref 40–60)
HGB BLD-MCNC: 11.2 G/DL (ref 12–17)
IMM GRANULOCYTES # BLD AUTO: 0.01 THOUSAND/UL (ref 0–0.2)
IMM GRANULOCYTES NFR BLD AUTO: 0 % (ref 0–2)
IRON SATN MFR SERPL: 5 %
IRON SERPL-MCNC: 21 UG/DL (ref 65–175)
LDLC SERPL CALC-MCNC: 97 MG/DL (ref 0–100)
LYMPHOCYTES # BLD AUTO: 1.13 THOUSANDS/ΜL (ref 0.6–4.47)
LYMPHOCYTES NFR BLD AUTO: 28 % (ref 14–44)
MCH RBC QN AUTO: 20 PG (ref 26.8–34.3)
MCHC RBC AUTO-ENTMCNC: 28.8 G/DL (ref 31.4–37.4)
MCV RBC AUTO: 69 FL (ref 82–98)
MONOCYTES # BLD AUTO: 0.43 THOUSAND/ΜL (ref 0.17–1.22)
MONOCYTES NFR BLD AUTO: 11 % (ref 4–12)
NEUTROPHILS # BLD AUTO: 2.16 THOUSANDS/ΜL (ref 1.85–7.62)
NEUTS SEG NFR BLD AUTO: 54 % (ref 43–75)
NRBC BLD AUTO-RTO: 0 /100 WBCS
PLATELET # BLD AUTO: 254 THOUSANDS/UL (ref 149–390)
PMV BLD AUTO: 11.4 FL (ref 8.9–12.7)
POTASSIUM SERPL-SCNC: 4 MMOL/L (ref 3.5–5.3)
PROT SERPL-MCNC: 7.5 G/DL (ref 6.4–8.2)
RBC # BLD AUTO: 5.61 MILLION/UL (ref 3.88–5.62)
SODIUM SERPL-SCNC: 139 MMOL/L (ref 136–145)
T4 FREE SERPL-MCNC: 0.8 NG/DL (ref 0.76–1.46)
TIBC SERPL-MCNC: 432 UG/DL (ref 250–450)
TRIGL SERPL-MCNC: 99 MG/DL
TSH SERPL DL<=0.05 MIU/L-ACNC: 6.39 UIU/ML (ref 0.36–3.74)
WBC # BLD AUTO: 4 THOUSAND/UL (ref 4.31–10.16)

## 2018-08-24 PROCEDURE — 85025 COMPLETE CBC W/AUTO DIFF WBC: CPT

## 2018-08-24 PROCEDURE — 83550 IRON BINDING TEST: CPT

## 2018-08-24 PROCEDURE — 36415 COLL VENOUS BLD VENIPUNCTURE: CPT

## 2018-08-24 PROCEDURE — 84153 ASSAY OF PSA TOTAL: CPT

## 2018-08-24 PROCEDURE — 84402 ASSAY OF FREE TESTOSTERONE: CPT

## 2018-08-24 PROCEDURE — 82728 ASSAY OF FERRITIN: CPT

## 2018-08-24 PROCEDURE — 84403 ASSAY OF TOTAL TESTOSTERONE: CPT

## 2018-08-24 PROCEDURE — 84439 ASSAY OF FREE THYROXINE: CPT

## 2018-08-24 PROCEDURE — 84443 ASSAY THYROID STIM HORMONE: CPT

## 2018-08-24 PROCEDURE — 80053 COMPREHEN METABOLIC PANEL: CPT

## 2018-08-24 PROCEDURE — 83036 HEMOGLOBIN GLYCOSYLATED A1C: CPT

## 2018-08-24 PROCEDURE — 84154 ASSAY OF PSA FREE: CPT

## 2018-08-24 PROCEDURE — 83540 ASSAY OF IRON: CPT

## 2018-08-24 PROCEDURE — 80061 LIPID PANEL: CPT

## 2018-08-25 LAB
PSA FREE MFR SERPL: 15 %
PSA FREE SERPL-MCNC: 0.18 NG/ML
PSA SERPL-MCNC: 1.2 NG/ML (ref 0–4)
TESTOST FREE SERPL-MCNC: 7 PG/ML (ref 6.8–21.5)
TESTOST SERPL-MCNC: 346 NG/DL (ref 264–916)

## 2018-08-28 DIAGNOSIS — E29.1 HYPOGONADISM IN MALE: ICD-10-CM

## 2018-08-28 DIAGNOSIS — E61.1 IRON DEFICIENCY: Primary | ICD-10-CM

## 2018-08-28 NOTE — PROGRESS NOTES
Called to give patient results and instructions  He is worried about the iron pills and his constipation   he has had hemorrhoids in the past and doesn't want to take them and make matters worse that way, is there a liquid iron he can try? Also he wants to know if there is any testosterone he can take to get to the 500-600 range? He is very concerned it will get lower and he wants more energy again  Please advise

## 2018-12-03 ENCOUNTER — OFFICE VISIT (OUTPATIENT)
Dept: UROLOGY | Facility: MEDICAL CENTER | Age: 43
End: 2018-12-03

## 2018-12-03 VITALS
BODY MASS INDEX: 32.13 KG/M2 | DIASTOLIC BLOOD PRESSURE: 78 MMHG | HEIGHT: 68 IN | HEART RATE: 70 BPM | WEIGHT: 212 LBS | SYSTOLIC BLOOD PRESSURE: 122 MMHG

## 2018-12-03 DIAGNOSIS — Z30.09 STERILIZATION CONSULT: Primary | ICD-10-CM

## 2018-12-03 PROCEDURE — 99204 OFFICE O/P NEW MOD 45 MIN: CPT | Performed by: UROLOGY

## 2018-12-03 RX ORDER — ALPRAZOLAM 1 MG/1
TABLET ORAL
Qty: 1 TABLET | Refills: 0 | Status: SHIPPED | OUTPATIENT
Start: 2018-12-03 | End: 2019-08-21 | Stop reason: ALTCHOICE

## 2018-12-03 RX ORDER — HYDROCODONE BITARTRATE AND ACETAMINOPHEN 5; 325 MG/1; MG/1
1-2 TABLET ORAL EVERY 6 HOURS PRN
Qty: 20 TABLET | Refills: 0 | Status: SHIPPED | OUTPATIENT
Start: 2018-12-03 | End: 2019-08-21 | Stop reason: ALTCHOICE

## 2018-12-03 NOTE — LETTER
December 3, 2018     Kelley Haskins, [address]    Patient: Radha Grace   YOB: 1975   Date of Visit: 12/3/2018       Dear Dr Vee Dumont: Thank you for referring Uriah Bonilla to me for evaluation  Below are my notes for this consultation  If you have questions, please do not hesitate to call me  I look forward to following your patient along with you  Sincerely,        Khari Sunshine MD        CC: No Recipients  Khari Sunshine MD  12/3/2018  4:45 PM  Sign at close encounter  Assessment/Plan:  1  Patient had testosterone level checked with PCP, both total and free are in the normal range  They are in the lower half of normal, but I have explained to him that is perfectly acceptable  There is no benefit, and perhaps some danger, in trying to make the level higher, when it actually is normal     2     This patient has two adult children and would like to have a vasectomy  He and his wife or partner are sure they want no more children, and are aware that vasectomy is intended to be a permanent form of sterilization  He has been told and understands the following: We will order a semen analysis to check for sterility after three months, and that he must use protection during that time  No guarantee can be made of permanent sterility, and that failure of the procedure is not common, but can occur  Furthermore, spontaneous reestablishment of the flow sperm is quite rare but is a possible reason for failure of the procedure  Although it is not mandatory, if he wants to request another semen sample at any time in the future, to ensure continued sterility, he can do so, at his decision           Potential complications of the procedure include, but are not limited to:  Excessive bleeding which can cause significant swelling; infections; chronic lingering pain of the testicle; damage to the blood supply of the testicle, which might lead to testicular atrophy  The patient has told me he understands the above statements, and wishes to proceed with scheduling the vasectomy  No problem-specific Assessment & Plan notes found for this encounter  Diagnoses and all orders for this visit:    Sterilization consult  -     ALPRAZolam (XANAX) 1 mg tablet; 1-2 hr before procedure  -     HYDROcodone-acetaminophen (NORCO) 5-325 mg per tablet; Take 1-2 tablets by mouth every 6 (six) hours as needed for pain Max Daily Amount: 8 tablets    Other orders  -     Omega-3 Fatty Acids (FISH OIL OMEGA-3 PO); Take by mouth  -     Multiple Vitamin (ONE-A-DAY MENS PO); Take by mouth          Subjective:      Patient ID: Basilio Chaves is a 43 y o  male  Patient wants        The following portions of the patient's history were reviewed and updated as appropriate: allergies, current medications, past family history, past medical history, past social history, past surgical history and problem list     Review of Systems   All other systems reviewed and are negative  Objective:      /78   Pulse 70   Ht 5' 8" (1 727 m)   Wt 96 2 kg (212 lb)   BMI 32 23 kg/m²           Physical Exam   Constitutional: He is oriented to person, place, and time  He appears well-developed and well-nourished  No distress  HENT:   Head: Normocephalic and atraumatic  Eyes: Conjunctivae are normal    Cardiovascular: Normal rate and regular rhythm  Pulmonary/Chest: Effort normal and breath sounds normal  No respiratory distress  He has no wheezes  Abdominal: Soft  Bowel sounds are normal  He exhibits no distension and no mass  There is no tenderness  Genitourinary:   Genitourinary Comments: Genitalia   Neurological: He is alert and oriented to person, place, and time  Skin: Skin is warm and dry  He is not diaphoretic

## 2018-12-03 NOTE — PROGRESS NOTES
Assessment/Plan:  1  Patient had testosterone level checked with PCP, both total and free are in the normal range  They are in the lower half of normal, but I have explained to him that is perfectly acceptable  There is no benefit, and perhaps some danger, in trying to make the level higher, when it actually is normal     2     This patient has two adult children and would like to have a vasectomy  He and his wife or partner are sure they want no more children, and are aware that vasectomy is intended to be a permanent form of sterilization  He has been told and understands the following: We will order a semen analysis to check for sterility after three months, and that he must use protection during that time  No guarantee can be made of permanent sterility, and that failure of the procedure is not common, but can occur  Furthermore, spontaneous reestablishment of the flow sperm is quite rare but is a possible reason for failure of the procedure  Although it is not mandatory, if he wants to request another semen sample at any time in the future, to ensure continued sterility, he can do so, at his decision  Potential complications of the procedure include, but are not limited to:  Excessive bleeding which can cause significant swelling; infections; chronic lingering pain of the testicle; damage to the blood supply of the testicle, which might lead to testicular atrophy  The patient has told me he understands the above statements, and wishes to proceed with scheduling the vasectomy  No problem-specific Assessment & Plan notes found for this encounter  Diagnoses and all orders for this visit:    Sterilization consult  -     ALPRAZolam (XANAX) 1 mg tablet; 1-2 hr before procedure  -     HYDROcodone-acetaminophen (NORCO) 5-325 mg per tablet;  Take 1-2 tablets by mouth every 6 (six) hours as needed for pain Max Daily Amount: 8 tablets    Other orders  -     Omega-3 Fatty Acids (FISH OIL OMEGA-3 PO); Take by mouth  -     Multiple Vitamin (ONE-A-DAY MENS PO); Take by mouth          Subjective:      Patient ID: Rafaela Fajardo is a 43 y o  male  Patient wants        The following portions of the patient's history were reviewed and updated as appropriate: allergies, current medications, past family history, past medical history, past social history, past surgical history and problem list     Review of Systems   All other systems reviewed and are negative  Objective:      /78   Pulse 70   Ht 5' 8" (1 727 m)   Wt 96 2 kg (212 lb)   BMI 32 23 kg/m²          Physical Exam   Constitutional: He is oriented to person, place, and time  He appears well-developed and well-nourished  No distress  HENT:   Head: Normocephalic and atraumatic  Eyes: Conjunctivae are normal    Cardiovascular: Normal rate and regular rhythm  Pulmonary/Chest: Effort normal and breath sounds normal  No respiratory distress  He has no wheezes  Abdominal: Soft  Bowel sounds are normal  He exhibits no distension and no mass  There is no tenderness  Genitourinary:   Genitourinary Comments: Genitalia   Neurological: He is alert and oriented to person, place, and time  Skin: Skin is warm and dry  He is not diaphoretic

## 2019-04-01 ENCOUNTER — HOSPITAL ENCOUNTER (EMERGENCY)
Facility: HOSPITAL | Age: 44
Discharge: HOME | End: 2019-04-01
Attending: EMERGENCY MEDICINE | Admitting: EMERGENCY MEDICINE
Payer: COMMERCIAL

## 2019-04-01 VITALS
TEMPERATURE: 98.7 F | DIASTOLIC BLOOD PRESSURE: 70 MMHG | HEIGHT: 67 IN | RESPIRATION RATE: 16 BRPM | HEART RATE: 58 BPM | SYSTOLIC BLOOD PRESSURE: 133 MMHG | BODY MASS INDEX: 32.96 KG/M2 | WEIGHT: 210 LBS | OXYGEN SATURATION: 98 %

## 2019-04-01 DIAGNOSIS — K08.89 PAIN, DENTAL: Primary | ICD-10-CM

## 2019-04-01 PROCEDURE — 64400 NJX AA&/STRD TRIGEMINAL NRV: CPT | Performed by: EMERGENCY MEDICINE

## 2019-04-01 PROCEDURE — 99283 EMERGENCY DEPT VISIT LOW MDM: CPT | Performed by: EMERGENCY MEDICINE

## 2019-04-01 PROCEDURE — 99282 EMERGENCY DEPT VISIT SF MDM: CPT

## 2019-04-01 RX ORDER — PENICILLIN V POTASSIUM 250 MG/1
500 TABLET ORAL ONCE
Status: COMPLETED | OUTPATIENT
Start: 2019-04-01 | End: 2019-04-01

## 2019-04-01 RX ORDER — BUPIVACAINE HYDROCHLORIDE 2.5 MG/ML
10 INJECTION, SOLUTION EPIDURAL; INFILTRATION; INTRACAUDAL ONCE
Status: COMPLETED | OUTPATIENT
Start: 2019-04-01 | End: 2019-04-01

## 2019-04-01 RX ORDER — PENICILLIN V POTASSIUM 500 MG/1
500 TABLET ORAL 4 TIMES DAILY
Qty: 28 TABLET | Refills: 0 | Status: SHIPPED | OUTPATIENT
Start: 2019-04-01 | End: 2019-04-08

## 2019-04-01 RX ADMIN — BUPIVACAINE HYDROCHLORIDE 10 ML: 2.5 INJECTION, SOLUTION EPIDURAL; INFILTRATION; INTRACAUDAL; PERINEURAL at 21:59

## 2019-04-01 RX ADMIN — PENICILLIN V POTASSIUM 500 MG: 250 TABLET, FILM COATED ORAL at 21:59

## 2019-08-21 ENCOUNTER — OFFICE VISIT (OUTPATIENT)
Dept: FAMILY MEDICINE CLINIC | Facility: CLINIC | Age: 44
End: 2019-08-21
Payer: COMMERCIAL

## 2019-08-21 VITALS
WEIGHT: 215.4 LBS | HEIGHT: 68 IN | OXYGEN SATURATION: 95 % | TEMPERATURE: 97.9 F | DIASTOLIC BLOOD PRESSURE: 86 MMHG | SYSTOLIC BLOOD PRESSURE: 122 MMHG | BODY MASS INDEX: 32.64 KG/M2 | HEART RATE: 64 BPM | RESPIRATION RATE: 16 BRPM

## 2019-08-21 DIAGNOSIS — R53.83 FATIGUE, UNSPECIFIED TYPE: ICD-10-CM

## 2019-08-21 DIAGNOSIS — R73.01 IFG (IMPAIRED FASTING GLUCOSE): ICD-10-CM

## 2019-08-21 DIAGNOSIS — Z00.00 ENCOUNTER FOR PREVENTIVE CARE: Primary | ICD-10-CM

## 2019-08-21 DIAGNOSIS — E61.1 IRON DEFICIENCY: ICD-10-CM

## 2019-08-21 PROBLEM — D50.9 IRON DEFICIENCY ANEMIA: Status: ACTIVE | Noted: 2019-08-21

## 2019-08-21 PROBLEM — M54.2 NECK PAIN: Status: RESOLVED | Noted: 2018-08-20 | Resolved: 2019-08-21

## 2019-08-21 PROBLEM — Z30.09 STERILIZATION CONSULT: Status: RESOLVED | Noted: 2018-12-03 | Resolved: 2019-08-21

## 2019-08-21 PROCEDURE — 99396 PREV VISIT EST AGE 40-64: CPT | Performed by: FAMILY MEDICINE

## 2019-08-21 NOTE — PROGRESS NOTES
Chief Complaint   Patient presents with    Follow-up     Routine overdue, requesting routine labs  Health Maintenance   Topic Date Due    BMI: Followup Plan  1993    DTaP,Tdap,and Td Vaccines (1 - Tdap) 1996    INFLUENZA VACCINE  2019    Depression Screening PHQ  2019    BMI: Adult  2020    Pneumococcal Vaccine: 65+ Years (1 of 2 - PCV13) 2040    Pneumococcal Vaccine: Pediatrics (0 to 5 Years) and At-Risk Patients (6 to 59 Years)  Aged Out    HEPATITIS B VACCINES  Aged Out     BMI Counseling: Body mass index is 32 75 kg/m²  Discussed the patient's BMI with him  The BMI is above average  BMI counseling and education was provided to the patient  Nutrition recommendations include reducing portion sizes, decreasing overall calorie intake and 3-5 servings of fruits/vegetables daily  Exercise recommendations include moderate aerobic physical activity for 150 minutes/week  Assessment/Plan:       Diagnoses and all orders for this visit:    Encounter for preventive care  -     Comprehensive metabolic panel; Future  -     Lipid Panel with Direct LDL reflex; Future  -     TSH, 3rd generation with Free T4 reflex; Future    Iron deficiency  -     CBC and differential; Future  -     Iron; Future  -     Iron Saturation %; Future  -     Ferritin; Future    IFG (impaired fasting glucose)  -     Hemoglobin A1C With EAG; Future    Fatigue, unspecified type  -     Testosterone, free, total; Future          Subjective:      Patient ID: Josefina Waite is a 37 y o  male  ADULT ANNUAL PHYSICAL  [facility]    NAME: Josefina Waite  AGE: 37 y o  SEX: male  : 1975     DATE: 2019     Assessment and Plan:    Problem List Items Addressed This Visit     None        Immunizations and preventive care screenings were discussed with patient today  Appropriate education was printed on patient's after visit summary      Counseling:  BMI Counseling: Body mass index is 32 75 kg/m²  Discussed the patient's BMI with him  The BMI is above average  BMI counseling and education was provided to the patient  Nutrition recommendations include reducing portion sizes, decreasing overall calorie intake and 3-5 servings of fruits/vegetables daily  Exercise recommendations include moderate aerobic physical activity for 150 minutes/week  Dental Health: discussed importance of regular tooth brushing, flossing, and dental visits  Injury prevention: discussed safety/seat belts, safety helmets, smoke detectors, carbon dioxide detectors, and smoking near bedding or upholstery  Sexual health: discussed sexually transmitted diseases, partner selection, use of condoms, avoidance of unintended pregnancy, and contraceptive alternatives  Chief Complaint:    Patient presents with: Follow-up: Routine overdue, requesting routine labs  History of Present Illness:    Adult Annual Physical   Patient here for a comprehensive physical exam  The patient reports see note  Diet and Physical Activity  Diet/Nutrition: limited fruits/vegetables  Exercise: 1-2 times a week on average  Depression Screening  PHQ-9 Depression Screening    PHQ-9:  0  Frequency of the following problems over the past two weeks:  0        General Health  Sleep: gets 4-6 hours of sleep on average  Hearing: normal - bilateral   Vision: no vision problems  Dental: regular dental visits   Health  Symptoms include: none    No smoking  Alcohol socially  No drugs  Review of Systems:    @[facility]@   Past Medical History:    Past Medical History:  8/20/2012: Chronic lymphocytic thyroiditis   Past Surgical History:    No past surgical history on file     Family History:    Review of patient's family history indicates:  Problem: No Known Problems      Relation: Mother          Age of Onset: (Not Specified)  Problem: No Known Problems      Relation: Father          Age of Onset: (Not Specified)  Problem: Coronary artery disease      Relation: Paternal Grandfather          Age of Onset: 61     Social History:    Social History    Socioeconomic History      Marital status: /Civil Union      Spouse name: Not on file      Number of children: Not on file      Years of education: Not on file      Highest education level: Not on file    Occupational History      Not on file    Social Needs      Financial resource strain: Not on file      Food insecurity:        Worry: Not on file        Inability: Not on file      Transportation needs:        Medical: Not on file        Non-medical: Not on file    Tobacco Use      Smoking status: Former Smoker      Smokeless tobacco: Never Used    Substance and Sexual Activity      Alcohol use: Not Currently      Drug use: No      Sexual activity: Not on file    Lifestyle      Physical activity:        Days per week: Not on file        Minutes per session: Not on file      Stress: Not on file    Relationships      Social connections:        Talks on phone: Not on file        Gets together: Not on file        Attends Congregation service: Not on file        Active member of club or organization: Not on file        Attends meetings of clubs or organizations: Not on file        Relationship status: Not on file      Intimate partner violence:        Fear of current or ex partner: Not on file        Emotionally abused: Not on file        Physically abused: Not on file        Forced sexual activity: Not on file    Other Topics      Concerns:        Not on file    Social History Narrative      Not on file     Current Medications:    Current Outpatient Medications:  Multiple Vitamin (ONE-A-DAY MENS PO), Take by mouth, Disp: , Rfl:   Omega-3 Fatty Acids (FISH OIL OMEGA-3 PO), Take by mouth, Disp: , Rfl:   ALPRAZolam (XANAX) 1 mg tablet, 1-2 hr before procedure (Patient not taking: Reported on 4/1/2019), Disp: 1 tablet, Rfl: 0  Arginine POWD, by Does not apply route, Disp: , Rfl:   HYDROcodone-acetaminophen (NORCO) 5-325 mg per tablet, Take 1-2 tablets by mouth every 6 (six) hours as needed for pain Max Daily Amount: 8 tablets (Patient not taking: Reported on 4/1/2019), Disp: 20 tablet, Rfl: 0    No current facility-administered medications for this visit  Allergies:     -- Ibuprofen -- Swelling    --  Lip swelling   -- Levothyroxine    Physical Exam:    /86 (BP Location: Left arm, Patient Position: Sitting, Cuff Size: Adult)   Pulse 64   Temp 97 9 °F (36 6 °C) (Tympanic)   Resp 16   Ht 5' 8" (1 727 m)   Wt 97 7 kg (215 lb 6 4 oz)   SpO2 95%   BMI 32 75 kg/m²     [unfilled]    Donald Huynh MD  [address]         Iron deficiency---Pt states he had low iron level before  He is on OTC iron daily, sometimes had constipation  Always feels tired  Would like to recheck labs including testosterone level  Denies lightheaded, palpitation, Sob, CP, n/v/abd pain       Obesity---BMI 32 75 today  Tried to eat healthy  Exercise 2/week       Quit smoking 10 years ago  Socially alcohol  No drugs       Denies depression  The following portions of the patient's history were reviewed and updated as appropriate: allergies, current medications, past family history, past medical history, past social history, past surgical history and problem list     Review of Systems   Constitutional: Negative for appetite change, chills and fever  HENT: Negative for congestion, ear pain, sinus pain and sore throat  Eyes: Negative for discharge and itching  Respiratory: Negative for apnea, cough, chest tightness, shortness of breath and wheezing  Cardiovascular: Negative for chest pain, palpitations and leg swelling  Gastrointestinal: Negative for abdominal pain, anal bleeding, constipation, diarrhea, nausea and vomiting  Endocrine: Negative for cold intolerance, heat intolerance and polyuria     Genitourinary: Negative for difficulty urinating and dysuria  Musculoskeletal: Negative for arthralgias, back pain and myalgias  Skin: Negative for rash  Neurological: Negative for dizziness and headaches  Psychiatric/Behavioral: Negative for agitation  Objective:      /86 (BP Location: Left arm, Patient Position: Sitting, Cuff Size: Adult)   Pulse 64   Temp 97 9 °F (36 6 °C) (Tympanic)   Resp 16   Ht 5' 8" (1 727 m)   Wt 97 7 kg (215 lb 6 4 oz)   SpO2 95%   BMI 32 75 kg/m²          Physical Exam   Constitutional: He appears well-developed  HENT:   Head: Normocephalic and atraumatic  Right Ear: External ear normal    Left Ear: External ear normal    Nose: Nose normal    Mouth/Throat: Oropharynx is clear and moist    Eyes: Pupils are equal, round, and reactive to light  Conjunctivae are normal    Neck: Normal range of motion  Neck supple  Cardiovascular: Normal rate, regular rhythm, normal heart sounds and intact distal pulses  Exam reveals no gallop and no friction rub  No murmur heard  Pulmonary/Chest: Effort normal and breath sounds normal  No stridor  No respiratory distress  He has no wheezes  He has no rales  He exhibits no tenderness  Abdominal: Soft  Bowel sounds are normal    Musculoskeletal: Normal range of motion  He exhibits no edema  Neurological: He is alert

## 2019-08-26 ENCOUNTER — APPOINTMENT (OUTPATIENT)
Dept: LAB | Facility: HOSPITAL | Age: 44
End: 2019-08-26
Payer: COMMERCIAL

## 2019-08-26 DIAGNOSIS — E61.1 IRON DEFICIENCY: ICD-10-CM

## 2019-08-26 DIAGNOSIS — R73.01 IFG (IMPAIRED FASTING GLUCOSE): ICD-10-CM

## 2019-08-26 DIAGNOSIS — Z00.00 ENCOUNTER FOR PREVENTIVE CARE: ICD-10-CM

## 2019-08-26 DIAGNOSIS — R53.83 FATIGUE, UNSPECIFIED TYPE: ICD-10-CM

## 2019-08-26 LAB
ALBUMIN SERPL BCP-MCNC: 3.7 G/DL (ref 3.5–5)
ALP SERPL-CCNC: 57 U/L (ref 46–116)
ALT SERPL W P-5'-P-CCNC: 21 U/L (ref 12–78)
ANION GAP SERPL CALCULATED.3IONS-SCNC: 6 MMOL/L (ref 4–13)
AST SERPL W P-5'-P-CCNC: 15 U/L (ref 5–45)
BASOPHILS # BLD AUTO: 0.03 THOUSANDS/ΜL (ref 0–0.1)
BASOPHILS NFR BLD AUTO: 1 % (ref 0–1)
BILIRUB SERPL-MCNC: 0.33 MG/DL (ref 0.2–1)
BUN SERPL-MCNC: 15 MG/DL (ref 5–25)
CALCIUM SERPL-MCNC: 8.6 MG/DL (ref 8.3–10.1)
CHLORIDE SERPL-SCNC: 109 MMOL/L (ref 100–108)
CHOLEST SERPL-MCNC: 173 MG/DL (ref 50–200)
CO2 SERPL-SCNC: 26 MMOL/L (ref 21–32)
CREAT SERPL-MCNC: 1.1 MG/DL (ref 0.6–1.3)
EOSINOPHIL # BLD AUTO: 0.23 THOUSAND/ΜL (ref 0–0.61)
EOSINOPHIL NFR BLD AUTO: 5 % (ref 0–6)
ERYTHROCYTE [DISTWIDTH] IN BLOOD BY AUTOMATED COUNT: 15.7 % (ref 11.6–15.1)
EST. AVERAGE GLUCOSE BLD GHB EST-MCNC: 111 MG/DL
FERRITIN SERPL-MCNC: 10 NG/ML (ref 8–388)
GFR SERPL CREATININE-BSD FRML MDRD: 82 ML/MIN/1.73SQ M
GLUCOSE P FAST SERPL-MCNC: 104 MG/DL (ref 65–99)
HBA1C MFR BLD: 5.5 % (ref 4.2–6.3)
HCT VFR BLD AUTO: 43.5 % (ref 36.5–49.3)
HDLC SERPL-MCNC: 33 MG/DL (ref 40–60)
HGB BLD-MCNC: 13.8 G/DL (ref 12–17)
IMM GRANULOCYTES # BLD AUTO: 0.01 THOUSAND/UL (ref 0–0.2)
IMM GRANULOCYTES NFR BLD AUTO: 0 % (ref 0–2)
IRON SATN MFR SERPL: 9 %
IRON SERPL-MCNC: 37 UG/DL (ref 65–175)
LDLC SERPL CALC-MCNC: 112 MG/DL (ref 0–100)
LYMPHOCYTES # BLD AUTO: 1.34 THOUSANDS/ΜL (ref 0.6–4.47)
LYMPHOCYTES NFR BLD AUTO: 27 % (ref 14–44)
MCH RBC QN AUTO: 25.5 PG (ref 26.8–34.3)
MCHC RBC AUTO-ENTMCNC: 31.7 G/DL (ref 31.4–37.4)
MCV RBC AUTO: 80 FL (ref 82–98)
MONOCYTES # BLD AUTO: 0.47 THOUSAND/ΜL (ref 0.17–1.22)
MONOCYTES NFR BLD AUTO: 9 % (ref 4–12)
NEUTROPHILS # BLD AUTO: 2.97 THOUSANDS/ΜL (ref 1.85–7.62)
NEUTS SEG NFR BLD AUTO: 58 % (ref 43–75)
NRBC BLD AUTO-RTO: 0 /100 WBCS
PLATELET # BLD AUTO: 187 THOUSANDS/UL (ref 149–390)
PMV BLD AUTO: 11.2 FL (ref 8.9–12.7)
POTASSIUM SERPL-SCNC: 3.8 MMOL/L (ref 3.5–5.3)
PROT SERPL-MCNC: 7.3 G/DL (ref 6.4–8.2)
RBC # BLD AUTO: 5.42 MILLION/UL (ref 3.88–5.62)
SODIUM SERPL-SCNC: 141 MMOL/L (ref 136–145)
T4 FREE SERPL-MCNC: 0.68 NG/DL (ref 0.76–1.46)
TIBC SERPL-MCNC: 401 UG/DL (ref 250–450)
TRIGL SERPL-MCNC: 140 MG/DL
TSH SERPL DL<=0.05 MIU/L-ACNC: 20.9 UIU/ML (ref 0.36–3.74)
WBC # BLD AUTO: 5.05 THOUSAND/UL (ref 4.31–10.16)

## 2019-08-26 PROCEDURE — 84443 ASSAY THYROID STIM HORMONE: CPT

## 2019-08-26 PROCEDURE — 83550 IRON BINDING TEST: CPT

## 2019-08-26 PROCEDURE — 84403 ASSAY OF TOTAL TESTOSTERONE: CPT

## 2019-08-26 PROCEDURE — 82728 ASSAY OF FERRITIN: CPT

## 2019-08-26 PROCEDURE — 85025 COMPLETE CBC W/AUTO DIFF WBC: CPT

## 2019-08-26 PROCEDURE — 80061 LIPID PANEL: CPT

## 2019-08-26 PROCEDURE — 83540 ASSAY OF IRON: CPT

## 2019-08-26 PROCEDURE — 84439 ASSAY OF FREE THYROXINE: CPT

## 2019-08-26 PROCEDURE — 83036 HEMOGLOBIN GLYCOSYLATED A1C: CPT

## 2019-08-26 PROCEDURE — 36415 COLL VENOUS BLD VENIPUNCTURE: CPT

## 2019-08-26 PROCEDURE — 84402 ASSAY OF FREE TESTOSTERONE: CPT

## 2019-08-26 PROCEDURE — 80053 COMPREHEN METABOLIC PANEL: CPT

## 2019-08-27 LAB
TESTOST FREE SERPL-MCNC: 7.3 PG/ML (ref 6.8–21.5)
TESTOST SERPL-MCNC: 313 NG/DL (ref 264–916)

## 2019-09-03 ENCOUNTER — OFFICE VISIT (OUTPATIENT)
Dept: FAMILY MEDICINE CLINIC | Facility: CLINIC | Age: 44
End: 2019-09-03
Payer: COMMERCIAL

## 2019-09-03 VITALS
HEIGHT: 68 IN | SYSTOLIC BLOOD PRESSURE: 112 MMHG | TEMPERATURE: 98.7 F | RESPIRATION RATE: 16 BRPM | BODY MASS INDEX: 32.67 KG/M2 | OXYGEN SATURATION: 97 % | DIASTOLIC BLOOD PRESSURE: 66 MMHG | HEART RATE: 60 BPM | WEIGHT: 215.6 LBS

## 2019-09-03 DIAGNOSIS — E61.1 IRON DEFICIENCY: ICD-10-CM

## 2019-09-03 DIAGNOSIS — E03.9 HYPOTHYROIDISM, UNSPECIFIED TYPE: Primary | ICD-10-CM

## 2019-09-03 DIAGNOSIS — E06.3 CHRONIC LYMPHOCYTIC THYROIDITIS: ICD-10-CM

## 2019-09-03 PROCEDURE — 3008F BODY MASS INDEX DOCD: CPT | Performed by: FAMILY MEDICINE

## 2019-09-03 PROCEDURE — 99214 OFFICE O/P EST MOD 30 MIN: CPT | Performed by: FAMILY MEDICINE

## 2019-09-03 RX ORDER — LEVOTHYROXINE SODIUM 0.05 MG/1
50 TABLET ORAL DAILY
Qty: 30 TABLET | Refills: 5 | Status: SHIPPED | OUTPATIENT
Start: 2019-09-03 | End: 2019-10-07 | Stop reason: SDUPTHER

## 2019-09-03 RX ORDER — FERROUS SULFATE 7.5 MG/0.5
30 SYRINGE (EA) ORAL 3 TIMES DAILY
Qty: 50 ML | Refills: 5 | Status: SHIPPED | OUTPATIENT
Start: 2019-09-03 | End: 2021-04-09 | Stop reason: SDUPTHER

## 2019-09-03 NOTE — PROGRESS NOTES
Chief Complaint   Patient presents with    Results     Review Lab Results     Health Maintenance   Topic Date Due    DTaP,Tdap,and Td Vaccines (1 - Tdap) 12/19/1996    INFLUENZA VACCINE  07/01/2019    Depression Screening PHQ  08/21/2020    BMI: Followup Plan  08/21/2020    BMI: Adult  08/21/2020    Pneumococcal Vaccine: 65+ Years (1 of 2 - PCV13) 12/19/2040    Pneumococcal Vaccine: Pediatrics (0 to 5 Years) and At-Risk Patients (6 to 59 Years)  Aged Out    HEPATITIS B VACCINES  Aged Out     Assessment/Plan:         Diagnoses and all orders for this visit:    Hypothyroidism, unspecified type  Comments:  Start synthroid 50mcg daily  Educated pt correct way to take it  Recheck TSH in 6 weeks  Orders:  -     levothyroxine 50 mcg tablet; Take 1 tablet (50 mcg total) by mouth daily  -     TSH, 3rd generation with Free T4 reflex; Future    Iron deficiency  Comments:  Give iron supplement liquid form per pt request    Orders:  -     ferrous sulfate (TORRES-IN-SOL) 75 (15 Fe) mg/mL drops; Take 0 4 mL (30 mg total) by mouth 3 (three) times a day          Subjective:      Patient ID: Yemi Lopez is a 37 y o  male  HPI    Pt is here by himself  Hypothyroidism---feels tired, cold, constipation, dry skin etc for a while  8/2019 TSH 20/9 elevated, free T4 0 68 low  Fhx of hypothyroidism  Pt states he tried levothyroxine before but feels palpitation?   He can tolerate synthroid  Iron deficiency---8/2019 Iron 37 low  Pt states iron pills made him constipation worse  Would like to try liquid form  The following portions of the patient's history were reviewed and updated as appropriate: allergies, current medications, past family history, past medical history, past social history, past surgical history and problem list     Review of Systems   Constitutional: Negative for appetite change, chills and fever  HENT: Negative for congestion, ear pain, sinus pain and sore throat      Eyes: Negative for discharge and itching  Respiratory: Negative for apnea, cough, chest tightness, shortness of breath and wheezing  Cardiovascular: Negative for chest pain, palpitations and leg swelling  Gastrointestinal: Negative for abdominal pain, anal bleeding, constipation, diarrhea, nausea and vomiting  Endocrine: Negative for cold intolerance, heat intolerance and polyuria  Genitourinary: Negative for difficulty urinating and dysuria  Musculoskeletal: Negative for arthralgias, back pain and myalgias  Skin: Negative for rash  Neurological: Negative for dizziness and headaches  Psychiatric/Behavioral: Negative for agitation  Objective:      /66 (BP Location: Left arm, Patient Position: Sitting, Cuff Size: Adult)   Pulse 60   Temp 98 7 °F (37 1 °C) (Tympanic)   Resp 16   Ht 5' 8" (1 727 m)   Wt 97 8 kg (215 lb 9 6 oz)   SpO2 97%   BMI 32 78 kg/m²          Physical Exam   Constitutional: He appears well-developed  HENT:   Head: Normocephalic and atraumatic  Eyes: Pupils are equal, round, and reactive to light  Conjunctivae are normal    Neck: Normal range of motion  Neck supple  Cardiovascular: Normal rate, regular rhythm, normal heart sounds and intact distal pulses  Pulmonary/Chest: Effort normal and breath sounds normal    Abdominal: Soft  Bowel sounds are normal    Musculoskeletal: Normal range of motion  Neurological: He is alert

## 2019-10-07 DIAGNOSIS — E03.9 HYPOTHYROIDISM, UNSPECIFIED TYPE: ICD-10-CM

## 2019-10-07 RX ORDER — LEVOTHYROXINE SODIUM 0.05 MG/1
50 TABLET ORAL DAILY
Qty: 30 TABLET | Refills: 0 | Status: SHIPPED | OUTPATIENT
Start: 2019-10-07 | End: 2019-11-13 | Stop reason: SDUPTHER

## 2019-10-07 NOTE — TELEPHONE ENCOUNTER
Patient wife called wants a refill on Levothyroxine 50 mcg tablets called in to [facility]  Can be reached at [phone] any questions

## 2019-11-13 DIAGNOSIS — E03.9 HYPOTHYROIDISM, UNSPECIFIED TYPE: ICD-10-CM

## 2019-11-13 RX ORDER — LEVOTHYROXINE SODIUM 0.05 MG/1
50 TABLET ORAL DAILY
Qty: 30 TABLET | Refills: 0 | Status: SHIPPED | OUTPATIENT
Start: 2019-11-13 | End: 2020-01-31 | Stop reason: SDUPTHER

## 2020-01-14 ENCOUNTER — OFFICE VISIT (OUTPATIENT)
Dept: FAMILY MEDICINE CLINIC | Facility: CLINIC | Age: 45
End: 2020-01-14
Payer: COMMERCIAL

## 2020-01-14 VITALS
HEIGHT: 67 IN | BODY MASS INDEX: 34.31 KG/M2 | WEIGHT: 218.6 LBS | HEART RATE: 60 BPM | OXYGEN SATURATION: 97 % | DIASTOLIC BLOOD PRESSURE: 88 MMHG | TEMPERATURE: 97.8 F | RESPIRATION RATE: 16 BRPM | SYSTOLIC BLOOD PRESSURE: 122 MMHG

## 2020-01-14 DIAGNOSIS — E66.9 OBESITY (BMI 30-39.9): ICD-10-CM

## 2020-01-14 DIAGNOSIS — E06.3 CHRONIC LYMPHOCYTIC THYROIDITIS: ICD-10-CM

## 2020-01-14 DIAGNOSIS — N52.9 ERECTILE DYSFUNCTION, UNSPECIFIED ERECTILE DYSFUNCTION TYPE: ICD-10-CM

## 2020-01-14 DIAGNOSIS — E61.1 IRON DEFICIENCY: ICD-10-CM

## 2020-01-14 DIAGNOSIS — M79.645 PAIN OF LEFT THUMB: Primary | ICD-10-CM

## 2020-01-14 DIAGNOSIS — R73.01 IFG (IMPAIRED FASTING GLUCOSE): ICD-10-CM

## 2020-01-14 DIAGNOSIS — D50.9 IRON DEFICIENCY ANEMIA, UNSPECIFIED IRON DEFICIENCY ANEMIA TYPE: ICD-10-CM

## 2020-01-14 PROCEDURE — 99214 OFFICE O/P EST MOD 30 MIN: CPT | Performed by: FAMILY MEDICINE

## 2020-01-14 NOTE — PROGRESS NOTES
Chief Complaint   Patient presents with    Thumb Pain     Left thumb pain, worse movement for 1 month  Assessment/Plan:    BMI Counseling: Body mass index is 34 24 kg/m²  The BMI is above normal  Nutrition recommendations include reducing portion sizes, decreasing overall calorie intake and 3-5 servings of fruits/vegetables daily  Exercise recommendations include moderate aerobic physical activity for 150 minutes/week  Chronic lymphocytic thyroiditis  Continue levothyroxine 50mcg daily  Check labs ASAP  IFG (impaired fasting glucose)  Low carb diet  Check labs  Iron deficiency  Continue iron supplement  Check labs  Refused flu shot  Advised pt to do labs ASAP  Will call pt about lab results  Diagnoses and all orders for this visit:    Pain of left thumb  -     XR hand 3+ vw left; Future  -     Ambulatory referral to Orthopedic Surgery; Future    Erectile dysfunction, unspecified erectile dysfunction type  -     Testosterone, free, total; Future    IFG (impaired fasting glucose)  -     CBC and differential; Future  -     Comprehensive metabolic panel; Future  -     Hemoglobin A1C With EAG; Future    Chronic lymphocytic thyroiditis  -     TSH, 3rd generation with Free T4 reflex; Future    Iron deficiency anemia, unspecified iron deficiency anemia type  -     CBC and differential; Future  -     Comprehensive metabolic panel; Future  -     Iron; Future  -     Iron Saturation %; Future  -     Ferritin; Future    Obesity (BMI 30-39  9)    Iron deficiency          Subjective:      Patient ID: Salomon Barone is a 40 y o  male  HPI      Pt is here by himself       C/o left thumb pain for 5 weeks  Constant  No injury  Left thumb a little swollen  He also felt his left thumb locked, cannot straighten  First episode  Hypothyroidism---feels tired, cold, constipation, dry skin etc for a while  8/2019 TSH 20/9 elevated, free T4 0 68 low   He started levothyroxine 50mcg daily for a while  No recent labs  Fhx of hypothyroidism       Iron deficiency---8/2019 Iron 37 low  Pt states he cannot tolerate iron pills  Tried iron powder daily in the morning which he can tolerate  Still feels tired  No recent labs  ED---Sometimes had morning erection  Would like to check testosterone level  Denies stress  NO smoking  No alcohol  No drugs  Denies depression  The following portions of the patient's history were reviewed and updated as appropriate: allergies, current medications, past family history, past medical history, past social history, past surgical history and problem list     Review of Systems   Constitutional: Positive for fatigue  Negative for appetite change, chills and fever  HENT: Negative for congestion, ear pain, sinus pain and sore throat  Eyes: Negative for discharge and itching  Respiratory: Negative for apnea, cough, chest tightness, shortness of breath and wheezing  Cardiovascular: Negative for chest pain, palpitations and leg swelling  Gastrointestinal: Negative for abdominal pain, anal bleeding, constipation, diarrhea, nausea and vomiting  Endocrine: Negative for cold intolerance, heat intolerance and polyuria  Genitourinary: Negative for difficulty urinating and dysuria  Musculoskeletal: Positive for arthralgias  Negative for back pain and myalgias  Skin: Negative for rash  Neurological: Negative for dizziness and headaches  Psychiatric/Behavioral: Negative for agitation  Objective:      /88 (BP Location: Left arm, Patient Position: Sitting, Cuff Size: Large)   Pulse 60   Temp 97 8 °F (36 6 °C) (Tympanic)   Resp 16   Ht 5' 7" (1 702 m)   Wt 99 2 kg (218 lb 9 6 oz)   SpO2 97%   BMI 34 24 kg/m²          Physical Exam   Constitutional: He appears well-developed  HENT:   Head: Normocephalic and atraumatic  Eyes: Pupils are equal, round, and reactive to light   Conjunctivae are normal    Neck: Normal range of motion  Neck supple  Cardiovascular: Normal rate, regular rhythm, normal heart sounds and intact distal pulses  Exam reveals no gallop and no friction rub  No murmur heard  Pulmonary/Chest: Effort normal and breath sounds normal  No stridor  No respiratory distress  He has no wheezes  He has no rales  Abdominal: Soft  Bowel sounds are normal    Musculoskeletal: He exhibits no edema  Left thumb base tenderness  Trigger finger  ROM limited  Neurological: He is alert

## 2020-01-15 ENCOUNTER — APPOINTMENT (OUTPATIENT)
Dept: LAB | Facility: HOSPITAL | Age: 45
End: 2020-01-15
Payer: COMMERCIAL

## 2020-01-15 DIAGNOSIS — N52.9 ERECTILE DYSFUNCTION, UNSPECIFIED ERECTILE DYSFUNCTION TYPE: ICD-10-CM

## 2020-01-15 DIAGNOSIS — D50.9 IRON DEFICIENCY ANEMIA, UNSPECIFIED IRON DEFICIENCY ANEMIA TYPE: ICD-10-CM

## 2020-01-15 DIAGNOSIS — E03.9 HYPOTHYROIDISM, UNSPECIFIED TYPE: ICD-10-CM

## 2020-01-15 DIAGNOSIS — R73.01 IFG (IMPAIRED FASTING GLUCOSE): ICD-10-CM

## 2020-01-15 DIAGNOSIS — E06.3 CHRONIC LYMPHOCYTIC THYROIDITIS: ICD-10-CM

## 2020-01-15 LAB
ALBUMIN SERPL BCP-MCNC: 3.7 G/DL (ref 3.5–5)
ALP SERPL-CCNC: 49 U/L (ref 46–116)
ALT SERPL W P-5'-P-CCNC: 30 U/L (ref 12–78)
ANION GAP SERPL CALCULATED.3IONS-SCNC: 4 MMOL/L (ref 4–13)
AST SERPL W P-5'-P-CCNC: 11 U/L (ref 5–45)
BASOPHILS # BLD AUTO: 0.04 THOUSANDS/ΜL (ref 0–0.1)
BASOPHILS NFR BLD AUTO: 1 % (ref 0–1)
BILIRUB SERPL-MCNC: 0.5 MG/DL (ref 0.2–1)
BUN SERPL-MCNC: 13 MG/DL (ref 5–25)
CALCIUM SERPL-MCNC: 9.1 MG/DL (ref 8.3–10.1)
CHLORIDE SERPL-SCNC: 110 MMOL/L (ref 100–108)
CO2 SERPL-SCNC: 26 MMOL/L (ref 21–32)
CREAT SERPL-MCNC: 1.09 MG/DL (ref 0.6–1.3)
EOSINOPHIL # BLD AUTO: 0.24 THOUSAND/ΜL (ref 0–0.61)
EOSINOPHIL NFR BLD AUTO: 6 % (ref 0–6)
ERYTHROCYTE [DISTWIDTH] IN BLOOD BY AUTOMATED COUNT: 12.5 % (ref 11.6–15.1)
EST. AVERAGE GLUCOSE BLD GHB EST-MCNC: 117 MG/DL
FERRITIN SERPL-MCNC: 7 NG/ML (ref 8–388)
GFR SERPL CREATININE-BSD FRML MDRD: 82 ML/MIN/1.73SQ M
GLUCOSE P FAST SERPL-MCNC: 97 MG/DL (ref 65–99)
HBA1C MFR BLD: 5.7 % (ref 4.2–6.3)
HCT VFR BLD AUTO: 45.5 % (ref 36.5–49.3)
HGB BLD-MCNC: 14.6 G/DL (ref 12–17)
IMM GRANULOCYTES # BLD AUTO: 0.01 THOUSAND/UL (ref 0–0.2)
IMM GRANULOCYTES NFR BLD AUTO: 0 % (ref 0–2)
IRON SATN MFR SERPL: 13 %
IRON SERPL-MCNC: 56 UG/DL (ref 65–175)
LYMPHOCYTES # BLD AUTO: 1.2 THOUSANDS/ΜL (ref 0.6–4.47)
LYMPHOCYTES NFR BLD AUTO: 28 % (ref 14–44)
MCH RBC QN AUTO: 26.5 PG (ref 26.8–34.3)
MCHC RBC AUTO-ENTMCNC: 32.1 G/DL (ref 31.4–37.4)
MCV RBC AUTO: 83 FL (ref 82–98)
MONOCYTES # BLD AUTO: 0.45 THOUSAND/ΜL (ref 0.17–1.22)
MONOCYTES NFR BLD AUTO: 10 % (ref 4–12)
NEUTROPHILS # BLD AUTO: 2.41 THOUSANDS/ΜL (ref 1.85–7.62)
NEUTS SEG NFR BLD AUTO: 55 % (ref 43–75)
NRBC BLD AUTO-RTO: 0 /100 WBCS
PLATELET # BLD AUTO: 205 THOUSANDS/UL (ref 149–390)
PMV BLD AUTO: 11.1 FL (ref 8.9–12.7)
POTASSIUM SERPL-SCNC: 4 MMOL/L (ref 3.5–5.3)
PROT SERPL-MCNC: 7.8 G/DL (ref 6.4–8.2)
RBC # BLD AUTO: 5.5 MILLION/UL (ref 3.88–5.62)
SODIUM SERPL-SCNC: 140 MMOL/L (ref 136–145)
T4 FREE SERPL-MCNC: 0.87 NG/DL (ref 0.76–1.46)
TIBC SERPL-MCNC: 422 UG/DL (ref 250–450)
TSH SERPL DL<=0.05 MIU/L-ACNC: 4.69 UIU/ML (ref 0.36–3.74)
WBC # BLD AUTO: 4.35 THOUSAND/UL (ref 4.31–10.16)

## 2020-01-15 PROCEDURE — 85025 COMPLETE CBC W/AUTO DIFF WBC: CPT

## 2020-01-15 PROCEDURE — 84439 ASSAY OF FREE THYROXINE: CPT

## 2020-01-15 PROCEDURE — 84402 ASSAY OF FREE TESTOSTERONE: CPT

## 2020-01-15 PROCEDURE — 84403 ASSAY OF TOTAL TESTOSTERONE: CPT

## 2020-01-15 PROCEDURE — 83540 ASSAY OF IRON: CPT

## 2020-01-15 PROCEDURE — 84443 ASSAY THYROID STIM HORMONE: CPT

## 2020-01-15 PROCEDURE — 83036 HEMOGLOBIN GLYCOSYLATED A1C: CPT

## 2020-01-15 PROCEDURE — 82728 ASSAY OF FERRITIN: CPT

## 2020-01-15 PROCEDURE — 80053 COMPREHEN METABOLIC PANEL: CPT

## 2020-01-15 PROCEDURE — 36415 COLL VENOUS BLD VENIPUNCTURE: CPT

## 2020-01-15 PROCEDURE — 83550 IRON BINDING TEST: CPT

## 2020-01-16 LAB
TESTOST FREE SERPL-MCNC: 9.4 PG/ML (ref 6.8–21.5)
TESTOST SERPL-MCNC: 414 NG/DL (ref 264–916)

## 2020-01-31 ENCOUNTER — OFFICE VISIT (OUTPATIENT)
Dept: OBGYN CLINIC | Facility: HOSPITAL | Age: 45
End: 2020-01-31
Payer: COMMERCIAL

## 2020-01-31 VITALS
DIASTOLIC BLOOD PRESSURE: 87 MMHG | SYSTOLIC BLOOD PRESSURE: 133 MMHG | HEIGHT: 68 IN | WEIGHT: 218 LBS | BODY MASS INDEX: 33.04 KG/M2 | HEART RATE: 62 BPM

## 2020-01-31 DIAGNOSIS — E03.9 HYPOTHYROIDISM, UNSPECIFIED TYPE: ICD-10-CM

## 2020-01-31 DIAGNOSIS — M79.645 PAIN OF LEFT THUMB: ICD-10-CM

## 2020-01-31 DIAGNOSIS — M65.312 TRIGGER THUMB OF LEFT HAND: Primary | ICD-10-CM

## 2020-01-31 PROCEDURE — 99243 OFF/OP CNSLTJ NEW/EST LOW 30: CPT | Performed by: ORTHOPAEDIC SURGERY

## 2020-01-31 PROCEDURE — 20550 NJX 1 TENDON SHEATH/LIGAMENT: CPT | Performed by: ORTHOPAEDIC SURGERY

## 2020-01-31 RX ORDER — LEVOTHYROXINE SODIUM 0.05 MG/1
50 TABLET ORAL DAILY
Qty: 30 TABLET | Refills: 5 | Status: SHIPPED | OUTPATIENT
Start: 2020-01-31 | End: 2020-09-03

## 2020-01-31 RX ORDER — BETAMETHASONE SODIUM PHOSPHATE AND BETAMETHASONE ACETATE 3; 3 MG/ML; MG/ML
6 INJECTION, SUSPENSION INTRA-ARTICULAR; INTRALESIONAL; INTRAMUSCULAR; SOFT TISSUE
Status: COMPLETED | OUTPATIENT
Start: 2020-01-31 | End: 2020-01-31

## 2020-01-31 RX ORDER — LIDOCAINE HYDROCHLORIDE 10 MG/ML
2 INJECTION, SOLUTION INFILTRATION; PERINEURAL
Status: COMPLETED | OUTPATIENT
Start: 2020-01-31 | End: 2020-01-31

## 2020-01-31 RX ADMIN — LIDOCAINE HYDROCHLORIDE 2 ML: 10 INJECTION, SOLUTION INFILTRATION; PERINEURAL at 22:25

## 2020-01-31 RX ADMIN — BETAMETHASONE SODIUM PHOSPHATE AND BETAMETHASONE ACETATE 6 MG: 3; 3 INJECTION, SUSPENSION INTRA-ARTICULAR; INTRALESIONAL; INTRAMUSCULAR; SOFT TISSUE at 22:25

## 2020-01-31 NOTE — PROGRESS NOTES
Chief Complaint   Patient presents with    Left Hand - Pain           Assessment:  Left trigger thumb    Plan :  I explained to the patient he has a left trigger thumb which is a nodule in the tendon that is blocking him from fully bending and straightening the thumb  I went over conservative versus operative options with him  I would like to be conservative 1st and therefore I injected the A1 pulley at the base of his left thumb with lidocaine and Celestone to try to correct this condition  I made it clear this is only successful in 60-70% of patients, but if not successful, then surgical release under local anesthesia carries a much higher success rate  He may use his finger as much as his pain permits  I would like to see him in 6 weeks to see if this conservative treatment has helped    HPI:   This 63-year-old [place] male was sent by his family physician for consultation for left thumb pain over the last 4 months  He notes no injury, trauma, or fall that brought this on  He works as an  at [employer] doing fairly heavy work but does remember 1 injury that brought this on  He is right-hand dominant  He has no numbness, tingling, or paresthesias    PMHx:         Past Medical History:   Diagnosis Date    Chronic lymphocytic thyroiditis 8/20/2012       History reviewed  No pertinent surgical history      Family History   Problem Relation Age of Onset    No Known Problems Mother     No Known Problems Father     Coronary artery disease Paternal Grandfather 61    Thyroid disease Sister     Thyroid disease Maternal Aunt     Thyroid disease Paternal Aunt        Social History     Socioeconomic History    Marital status: /Civil Union     Spouse name: Not on file    Number of children: Not on file    Years of education: Not on file    Highest education level: Not on file   Occupational History    Not on file   Social Needs    Financial resource strain: Not on file    Food insecurity: Worry: Not on file     Inability: Not on file    Transportation needs:     Medical: Not on file     Non-medical: Not on file   Tobacco Use    Smoking status: Former Smoker    Smokeless tobacco: Never Used   Substance and Sexual Activity    Alcohol use: Not Currently    Drug use: No    Sexual activity: Not on file   Lifestyle    Physical activity:     Days per week: Not on file     Minutes per session: Not on file    Stress: Not on file   Relationships    Social connections:     Talks on phone: Not on file     Gets together: Not on file     Attends [employer] service: Not on file     Active member of club or organization: Not on file     Attends meetings of clubs or organizations: Not on file     Relationship status: Not on file    Intimate partner violence:     Fear of current or ex partner: Not on file     Emotionally abused: Not on file     Physically abused: Not on file     Forced sexual activity: Not on file   Other Topics Concern    Not on file   Social History Narrative    Not on file       Current Outpatient Medications   Medication Sig Dispense Refill    Arginine POWD by Does not apply route      ferrous sulfate (TORRES-IN-SOL) 75 (15 Fe) mg/mL drops Take 0 4 mL (30 mg total) by mouth 3 (three) times a day 50 mL 5    levothyroxine 50 mcg tablet Take 1 tablet (50 mcg total) by mouth daily 30 tablet 0    Multiple Vitamin (ONE-A-DAY MENS PO) Take by mouth      Omega-3 Fatty Acids (FISH OIL OMEGA-3 PO) Take by mouth       No current facility-administered medications for this visit  Allergies: Ibuprofen and Levothyroxine    ROS:  Positive for blood sugar issues,ED,ortho complaints as above  The remaining 9/12 systems on the intake sheet that I reviewed were negative  PE:  /87   Pulse 62   Ht 5' 8" (1 727 m)   Wt 98 9 kg (218 lb)   BMI 33 15 kg/m²   Constitutional: The patient was  oriented to person, place, and time  Well-developed and well-nourished   In no acute distress  HEENT: Vision intact  Hearing normal  Swallowing normal   Head: Normocephalic  Cardiovascular: Intact distal pulses  Pulse regular  Pulmonary/Chest: Effort normal  No respiratory distress  Neurological: Alert and oriented to person, place, and time  Skin: Skin is warm  Psychiatric: Normal mood and affect  Ortho Exam:  There been no swelling, ecchymosis, redness over his left hand  A large painful nodule at the base of his left thumb  He cannot fully extend or fully flex the thumb  Passive extension cause severe pain and he could not fully extend the finger  There was no angulation or rotation of this thumb  Sensation and capillary refill were intact in the tip of the thumb  There are no other nodules or triggering of the lesser 4 fingers  Radial pulse was intact  He had good left elbow flexion extension with no antecubital adenopathy      Studies reviewed: none    Hand/upper extremity injection: L thumb A1  Date/Time: 1/31/2020 10:25 PM  Consent given by: patient  Site marked: site marked  Supporting Documentation  Indications: pain and tendon swelling   Procedure Details  Condition:trigger finger Location: thumb - L thumb A1   Preparation: Patient was prepped and draped in the usual sterile fashion  Needle size: 25 G  Ultrasound guidance: no  Approach: volar  Medications administered: 2 mL lidocaine 1 %; 6 mg betamethasone acetate-betamethasone sodium phosphate 6 (3-3) mg/mL    Patient tolerance: patient tolerated the procedure well with no immediate complications  Dressing:  Sterile dressing applied

## 2020-01-31 NOTE — PATIENT INSTRUCTIONS
I explained to the patient he has a left trigger thumb which is a nodule in the tendon that is blocking him from fully bending and straightening the thumb  I went over conservative versus operative options with him  I would like to be conservative 1st and therefore I injected the A1 pulley at the base of his left thumb with lidocaine and Celestone to try to correct this condition  I made it clear this is only successful in 60-70% of patients, but if not successful, then surgical release under local anesthesia carries a much higher success rate  He may use his finger as much as his pain permits    I would like to see him in 6 weeks to see if this conservative treatment has helped

## 2020-06-29 ENCOUNTER — TELEMEDICINE (OUTPATIENT)
Dept: FAMILY MEDICINE CLINIC | Facility: CLINIC | Age: 45
End: 2020-06-29
Payer: COMMERCIAL

## 2020-06-29 DIAGNOSIS — Z20.828 EXPOSURE TO SARS-ASSOCIATED CORONAVIRUS: Primary | ICD-10-CM

## 2020-06-29 PROCEDURE — 99213 OFFICE O/P EST LOW 20 MIN: CPT | Performed by: NURSE PRACTITIONER

## 2020-09-03 DIAGNOSIS — E03.9 HYPOTHYROIDISM, UNSPECIFIED TYPE: ICD-10-CM

## 2020-09-03 RX ORDER — LEVOTHYROXINE SODIUM 0.05 MG/1
50 TABLET ORAL DAILY
Qty: 30 TABLET | Refills: 5 | Status: SHIPPED | OUTPATIENT
Start: 2020-09-03 | End: 2021-04-09 | Stop reason: SDUPTHER

## 2021-03-12 ENCOUNTER — TELEPHONE (OUTPATIENT)
Dept: FAMILY MEDICINE CLINIC | Facility: CLINIC | Age: 46
End: 2021-03-12

## 2021-03-12 DIAGNOSIS — Z20.822 EXPOSURE TO COVID-19 VIRUS: ICD-10-CM

## 2021-03-12 DIAGNOSIS — Z20.822 EXPOSURE TO COVID-19 VIRUS: Primary | ICD-10-CM

## 2021-03-12 PROCEDURE — U0003 INFECTIOUS AGENT DETECTION BY NUCLEIC ACID (DNA OR RNA); SEVERE ACUTE RESPIRATORY SYNDROME CORONAVIRUS 2 (SARS-COV-2) (CORONAVIRUS DISEASE [COVID-19]), AMPLIFIED PROBE TECHNIQUE, MAKING USE OF HIGH THROUGHPUT TECHNOLOGIES AS DESCRIBED BY CMS-2020-01-R: HCPCS | Performed by: FAMILY MEDICINE

## 2021-03-12 PROCEDURE — U0005 INFEC AGEN DETEC AMPLI PROBE: HCPCS | Performed by: FAMILY MEDICINE

## 2021-03-12 NOTE — TELEPHONE ENCOUNTER
Patient exposed to Covid by co worker  Would like to be tested today, will go to [facility] [address] [facility]   Please place order

## 2021-03-13 LAB — SARS-COV-2 RNA RESP QL NAA+PROBE: NEGATIVE

## 2021-03-31 ENCOUNTER — TELEPHONE (OUTPATIENT)
Dept: FAMILY MEDICINE CLINIC | Facility: CLINIC | Age: 46
End: 2021-03-31

## 2021-03-31 DIAGNOSIS — N52.9 ERECTILE DYSFUNCTION, UNSPECIFIED ERECTILE DYSFUNCTION TYPE: ICD-10-CM

## 2021-03-31 DIAGNOSIS — R73.01 IFG (IMPAIRED FASTING GLUCOSE): ICD-10-CM

## 2021-03-31 DIAGNOSIS — D50.9 IRON DEFICIENCY ANEMIA, UNSPECIFIED IRON DEFICIENCY ANEMIA TYPE: ICD-10-CM

## 2021-03-31 DIAGNOSIS — Z13.220 SCREENING FOR HYPERLIPIDEMIA: ICD-10-CM

## 2021-03-31 DIAGNOSIS — E06.3 CHRONIC LYMPHOCYTIC THYROIDITIS: Primary | ICD-10-CM

## 2021-03-31 NOTE — TELEPHONE ENCOUNTER
Pt would l;[facility] lab orders for his routines exam including his iron level , testosterone level    Please advise let pt know if they are in chart

## 2021-04-06 ENCOUNTER — APPOINTMENT (OUTPATIENT)
Dept: LAB | Facility: HOSPITAL | Age: 46
End: 2021-04-06
Payer: COMMERCIAL

## 2021-04-06 DIAGNOSIS — Z13.220 SCREENING FOR HYPERLIPIDEMIA: ICD-10-CM

## 2021-04-06 DIAGNOSIS — D50.9 IRON DEFICIENCY ANEMIA, UNSPECIFIED IRON DEFICIENCY ANEMIA TYPE: ICD-10-CM

## 2021-04-06 DIAGNOSIS — E06.3 CHRONIC LYMPHOCYTIC THYROIDITIS: ICD-10-CM

## 2021-04-06 DIAGNOSIS — R73.01 IFG (IMPAIRED FASTING GLUCOSE): ICD-10-CM

## 2021-04-06 DIAGNOSIS — N52.9 ERECTILE DYSFUNCTION, UNSPECIFIED ERECTILE DYSFUNCTION TYPE: ICD-10-CM

## 2021-04-06 LAB
ALBUMIN SERPL BCP-MCNC: 3.8 G/DL (ref 3.5–5)
ALP SERPL-CCNC: 50 U/L (ref 46–116)
ALT SERPL W P-5'-P-CCNC: 22 U/L (ref 12–78)
ANION GAP SERPL CALCULATED.3IONS-SCNC: 6 MMOL/L (ref 4–13)
AST SERPL W P-5'-P-CCNC: 12 U/L (ref 5–45)
BASOPHILS # BLD AUTO: 0.02 THOUSANDS/ΜL (ref 0–0.1)
BASOPHILS NFR BLD AUTO: 0 % (ref 0–1)
BILIRUB SERPL-MCNC: 0.63 MG/DL (ref 0.2–1)
BUN SERPL-MCNC: 11 MG/DL (ref 5–25)
CALCIUM SERPL-MCNC: 9.5 MG/DL (ref 8.3–10.1)
CHLORIDE SERPL-SCNC: 107 MMOL/L (ref 100–108)
CHOLEST SERPL-MCNC: 190 MG/DL (ref 50–200)
CO2 SERPL-SCNC: 29 MMOL/L (ref 21–32)
CREAT SERPL-MCNC: 1.16 MG/DL (ref 0.6–1.3)
EOSINOPHIL # BLD AUTO: 0.23 THOUSAND/ΜL (ref 0–0.61)
EOSINOPHIL NFR BLD AUTO: 5 % (ref 0–6)
ERYTHROCYTE [DISTWIDTH] IN BLOOD BY AUTOMATED COUNT: 12.7 % (ref 11.6–15.1)
EST. AVERAGE GLUCOSE BLD GHB EST-MCNC: 108 MG/DL
FERRITIN SERPL-MCNC: 12 NG/ML (ref 8–388)
GFR SERPL CREATININE-BSD FRML MDRD: 76 ML/MIN/1.73SQ M
GLUCOSE P FAST SERPL-MCNC: 96 MG/DL (ref 65–99)
HBA1C MFR BLD: 5.4 %
HCT VFR BLD AUTO: 45.8 % (ref 36.5–49.3)
HDLC SERPL-MCNC: 34 MG/DL
HGB BLD-MCNC: 14.7 G/DL (ref 12–17)
IMM GRANULOCYTES # BLD AUTO: 0.01 THOUSAND/UL (ref 0–0.2)
IMM GRANULOCYTES NFR BLD AUTO: 0 % (ref 0–2)
IRON SATN MFR SERPL: 25 %
IRON SERPL-MCNC: 103 UG/DL (ref 65–175)
LDLC SERPL CALC-MCNC: 121 MG/DL (ref 0–100)
LYMPHOCYTES # BLD AUTO: 1.06 THOUSANDS/ΜL (ref 0.6–4.47)
LYMPHOCYTES NFR BLD AUTO: 24 % (ref 14–44)
MCH RBC QN AUTO: 26.9 PG (ref 26.8–34.3)
MCHC RBC AUTO-ENTMCNC: 32.1 G/DL (ref 31.4–37.4)
MCV RBC AUTO: 84 FL (ref 82–98)
MONOCYTES # BLD AUTO: 0.54 THOUSAND/ΜL (ref 0.17–1.22)
MONOCYTES NFR BLD AUTO: 12 % (ref 4–12)
NEUTROPHILS # BLD AUTO: 2.61 THOUSANDS/ΜL (ref 1.85–7.62)
NEUTS SEG NFR BLD AUTO: 59 % (ref 43–75)
NRBC BLD AUTO-RTO: 0 /100 WBCS
PLATELET # BLD AUTO: 197 THOUSANDS/UL (ref 149–390)
PMV BLD AUTO: 11.1 FL (ref 8.9–12.7)
POTASSIUM SERPL-SCNC: 3.7 MMOL/L (ref 3.5–5.3)
PROT SERPL-MCNC: 7.4 G/DL (ref 6.4–8.2)
RBC # BLD AUTO: 5.47 MILLION/UL (ref 3.88–5.62)
SODIUM SERPL-SCNC: 142 MMOL/L (ref 136–145)
T4 FREE SERPL-MCNC: 0.82 NG/DL (ref 0.76–1.46)
TIBC SERPL-MCNC: 417 UG/DL (ref 250–450)
TRIGL SERPL-MCNC: 174 MG/DL
TSH SERPL DL<=0.05 MIU/L-ACNC: 6.33 UIU/ML (ref 0.36–3.74)
WBC # BLD AUTO: 4.47 THOUSAND/UL (ref 4.31–10.16)

## 2021-04-06 PROCEDURE — 84402 ASSAY OF FREE TESTOSTERONE: CPT

## 2021-04-06 PROCEDURE — 83036 HEMOGLOBIN GLYCOSYLATED A1C: CPT

## 2021-04-06 PROCEDURE — 80061 LIPID PANEL: CPT

## 2021-04-06 PROCEDURE — 83540 ASSAY OF IRON: CPT

## 2021-04-06 PROCEDURE — 84443 ASSAY THYROID STIM HORMONE: CPT

## 2021-04-06 PROCEDURE — 83550 IRON BINDING TEST: CPT

## 2021-04-06 PROCEDURE — 36415 COLL VENOUS BLD VENIPUNCTURE: CPT

## 2021-04-06 PROCEDURE — 80053 COMPREHEN METABOLIC PANEL: CPT

## 2021-04-06 PROCEDURE — 84439 ASSAY OF FREE THYROXINE: CPT

## 2021-04-06 PROCEDURE — 84403 ASSAY OF TOTAL TESTOSTERONE: CPT

## 2021-04-06 PROCEDURE — 85025 COMPLETE CBC W/AUTO DIFF WBC: CPT

## 2021-04-06 PROCEDURE — 82728 ASSAY OF FERRITIN: CPT

## 2021-04-07 LAB
TESTOST FREE SERPL-MCNC: 10.2 PG/ML (ref 6.8–21.5)
TESTOST SERPL-MCNC: 354 NG/DL (ref 264–916)

## 2021-04-09 ENCOUNTER — TELEMEDICINE (OUTPATIENT)
Dept: FAMILY MEDICINE CLINIC | Facility: CLINIC | Age: 46
End: 2021-04-09
Payer: COMMERCIAL

## 2021-04-09 DIAGNOSIS — E03.9 HYPOTHYROIDISM, UNSPECIFIED TYPE: Primary | ICD-10-CM

## 2021-04-09 DIAGNOSIS — R73.01 IFG (IMPAIRED FASTING GLUCOSE): ICD-10-CM

## 2021-04-09 DIAGNOSIS — E78.1 HYPERTRIGLYCERIDEMIA: ICD-10-CM

## 2021-04-09 DIAGNOSIS — E61.1 IRON DEFICIENCY: ICD-10-CM

## 2021-04-09 PROCEDURE — 99213 OFFICE O/P EST LOW 20 MIN: CPT | Performed by: FAMILY MEDICINE

## 2021-04-09 RX ORDER — FERROUS SULFATE 7.5 MG/0.5
30 SYRINGE (EA) ORAL 3 TIMES DAILY
Qty: 50 ML | Refills: 5 | Status: SHIPPED | OUTPATIENT
Start: 2021-04-09

## 2021-04-09 RX ORDER — LEVOTHYROXINE SODIUM 0.05 MG/1
50 TABLET ORAL DAILY
Qty: 30 TABLET | Refills: 11 | Status: SHIPPED | OUTPATIENT
Start: 2021-04-09

## 2021-04-09 NOTE — PROGRESS NOTES
Virtual Regular Visit      Assessment/Plan:    Problem List Items Addressed This Visit        Endocrine    Hypothyroidism - Primary     4/2021 TSH 6 33 elevated but free T4 0 82 normal  Continue levothyroxine 50mcg daily  Relevant Medications    levothyroxine 50 mcg tablet    IFG (impaired fasting glucose)     4/2021 hgA1C 5 4 normal  Low carb diet  Other    Iron deficiency     4/2021 iron panel normal  Continue iron supplement  Relevant Medications    ferrous sulfate (TORRES-IN-SOL) 75 (15 Fe) mg/mL drops    Hypertriglyceridemia     Reviewed lab in 4/2021 Lipid 190/174/34/121 ok  Low fat diet  BMI Counseling: BMI was not able to be calculated due to patient refusing height and/or weight  Reviewed lab in 4/2021  TSH 6 33 elevated, free T4 0 82 normal    CBC normal  CMP normal  HgA1C 5 4 normal  Lipid 190/174/34/121 ok  testosteron 354 stable  Iron panel normal    Not interested in Covid19 vaccine  RTO in 1 year or sooner as needed  Reason for visit is   Chief Complaint   Patient presents with    Virtual Regular Visit     Review Lab Results        Encounter provider Ramon Hardwick MD    Provider located at [facility] [address]      Recent Visits  No visits were found meeting these conditions  Showing recent visits within past 7 days and meeting all other requirements     Today's Visits  Date Type Provider Dept   04/09/21 [facility] [address], [address] today's visits and meeting all other requirements     Future Appointments  No visits were found meeting these conditions  Showing future appointments within next 150 days and meeting all other requirements      It was my intent to perform this visit via video technology but the patient was not able to do a video connection so the visit was completed via audio telephone only      The patient was identified by name and date of birth  Agusto Lindsey was informed that this is a telemedicine visit and that the visit is being conducted through telephone  My office door was closed  No one else was in the room  He acknowledged consent and understanding of privacy and security of the video platform  The patient has agreed to participate and understands they can discontinue the visit at any time  Patient is aware this is a billable service  Subjective  Agusto Lindsey is a 39 y o  male for follow up   HPI     Hypothyroidism---He is on levothyroxine 50mcg daily  Feels fine  Fhx of hypothyroidism       Iron deficiency---Pt is on iron drops  Recent labs are normal    Feels ok       Recent testosterone level stable       NO smoking  No alcohol  No drugs       Denies depression  Past Medical History:   Diagnosis Date    Chronic lymphocytic thyroiditis 8/20/2012       History reviewed  No pertinent surgical history  Current Outpatient Medications   Medication Sig Dispense Refill    Arginine POWD by Does not apply route      ferrous sulfate (TORRES-IN-SOL) 75 (15 Fe) mg/mL drops Take 0 4 mL (30 mg total) by mouth 3 (three) times a day 50 mL 5    levothyroxine 50 mcg tablet Take 1 tablet (50 mcg total) by mouth daily 30 tablet 11    Multiple Vitamin (ONE-A-DAY MENS PO) Take by mouth      Omega-3 Fatty Acids (FISH OIL OMEGA-3 PO) Take by mouth       No current facility-administered medications for this visit  Allergies   Allergen Reactions    Ibuprofen Swelling     Lip swelling       Review of Systems   Constitutional: Negative for appetite change, chills and fever  HENT: Negative for congestion, ear pain, sinus pain and sore throat  Eyes: Negative for discharge and itching  Respiratory: Negative for apnea, cough, chest tightness, shortness of breath and wheezing  Cardiovascular: Negative for chest pain, palpitations and leg swelling     Gastrointestinal: Negative for abdominal pain, anal bleeding, constipation, diarrhea, nausea and vomiting  Endocrine: Negative for cold intolerance, heat intolerance and polyuria  Genitourinary: Negative for difficulty urinating and dysuria  Musculoskeletal: Negative for arthralgias, back pain and myalgias  Skin: Negative for rash  Neurological: Negative for dizziness and headaches  Psychiatric/Behavioral: Negative for agitation  Video Exam    There were no vitals filed for this visit  Physical Exam     I spent 15 minutes directly with the patient during this visit      VIRTUAL VISIT DISCLAIMER    Philippe Villanueva acknowledges that he has consented to an online visit or consultation  He understands that the online visit is based solely on information provided by him, and that, in the absence of a face-to-face physical evaluation by the physician, the diagnosis he receives is both limited and provisional in terms of accuracy and completeness  This is not intended to replace a full medical face-to-face evaluation by the physician  Philippe Villanueva understands and accepts these terms

## 2021-06-16 DIAGNOSIS — E03.9 HYPOTHYROIDISM, UNSPECIFIED TYPE: ICD-10-CM

## 2021-06-16 RX ORDER — LEVOTHYROXINE SODIUM 0.05 MG/1
50 TABLET ORAL DAILY
Qty: 30 TABLET | Refills: 11 | OUTPATIENT
Start: 2021-06-16

## 2021-10-19 ENCOUNTER — TELEPHONE (OUTPATIENT)
Dept: FAMILY MEDICINE CLINIC | Facility: CLINIC | Age: 46
End: 2021-10-19

## 2021-10-19 DIAGNOSIS — Z11.9 ENCOUNTER FOR SCREENING FOR INFECTIOUS AND PARASITIC DISEASES, UNSPECIFIED: Primary | ICD-10-CM

## 2021-12-13 ENCOUNTER — TELEPHONE (OUTPATIENT)
Dept: FAMILY MEDICINE CLINIC | Facility: CLINIC | Age: 46
End: 2021-12-13

## 2022-04-11 ENCOUNTER — TELEPHONE (OUTPATIENT)
Dept: FAMILY MEDICINE CLINIC | Facility: CLINIC | Age: 47
End: 2022-04-11

## 2022-04-11 NOTE — TELEPHONE ENCOUNTER
Patient moved to [place] and is no longer our patient-patient asked that we remove Dr Alex Lopez and provided his new address in [place] when we cancelled his 4/12/22 appt

## 2022-06-19 NOTE — TELEPHONE ENCOUNTER
06/18/22 11:51 PM     Thank you for your request  Your request has been received, reviewed, and the patient chart updated  The PCP has successfully been removed with a patient attribution note  This message will now be completed      Thank you  Gayle Flower